# Patient Record
Sex: FEMALE | Race: WHITE | NOT HISPANIC OR LATINO | URBAN - METROPOLITAN AREA
[De-identification: names, ages, dates, MRNs, and addresses within clinical notes are randomized per-mention and may not be internally consistent; named-entity substitution may affect disease eponyms.]

---

## 2017-02-20 ENCOUNTER — OFFICE VISIT - HEALTHEAST (OUTPATIENT)
Dept: FAMILY MEDICINE | Facility: CLINIC | Age: 42
End: 2017-02-20

## 2017-02-20 DIAGNOSIS — N92.6 MISSED MENSES: ICD-10-CM

## 2017-02-20 DIAGNOSIS — E03.9 HYPOTHYROIDISM: ICD-10-CM

## 2017-02-20 DIAGNOSIS — Z34.91 PREGNANT AND NOT YET DELIVERED IN FIRST TRIMESTER: ICD-10-CM

## 2017-02-20 DIAGNOSIS — Z64.1 GRAND MULTIPARA: ICD-10-CM

## 2017-02-21 ENCOUNTER — AMBULATORY - HEALTHEAST (OUTPATIENT)
Dept: FAMILY MEDICINE | Facility: CLINIC | Age: 42
End: 2017-02-21

## 2017-03-08 ENCOUNTER — HOSPITAL ENCOUNTER (OUTPATIENT)
Dept: ULTRASOUND IMAGING | Facility: HOSPITAL | Age: 42
Discharge: HOME | End: 2017-03-08
Attending: ADVANCED PRACTICE MIDWIFE

## 2017-03-08 ENCOUNTER — AMBULATORY - HEALTHEAST (OUTPATIENT)
Dept: MIDWIFE SERVICES | Facility: CLINIC | Age: 42
End: 2017-03-08

## 2017-03-08 ENCOUNTER — PRENATAL OFFICE VISIT - HEALTHEAST (OUTPATIENT)
Dept: MIDWIFE SERVICES | Facility: CLINIC | Age: 42
End: 2017-03-08

## 2017-03-08 DIAGNOSIS — O09.529 SUPERVISION OF HIGH-RISK PREGNANCY OF ELDERLY MULTIGRAVIDA: ICD-10-CM

## 2017-03-08 LAB — HIV 1+2 AB+HIV1 P24 AG SERPL QL IA: NEGATIVE

## 2017-03-08 ASSESSMENT — MIFFLIN-ST. JEOR: SCORE: 1266.16

## 2017-03-09 LAB
HBV SURFACE AG SERPL QL IA: NEGATIVE
HCV AB SERPL QL IA: NEGATIVE
SYPHILIS RPR SCREEN - HISTORICAL: NORMAL

## 2017-03-15 ENCOUNTER — PRENATAL OFFICE VISIT - HEALTHEAST (OUTPATIENT)
Dept: MIDWIFE SERVICES | Facility: CLINIC | Age: 42
End: 2017-03-15

## 2017-03-15 DIAGNOSIS — E03.9 HYPOTHYROIDISM, UNSPECIFIED TYPE: ICD-10-CM

## 2017-03-15 DIAGNOSIS — I83.91 ASYMPTOMATIC VARICOSE VEINS, RIGHT: ICD-10-CM

## 2017-03-15 DIAGNOSIS — O09.521 SUPERVISION OF HIGH-RISK PREGNANCY OF ELDERLY MULTIGRAVIDA, FIRST TRIMESTER: ICD-10-CM

## 2017-03-15 ASSESSMENT — MIFFLIN-ST. JEOR: SCORE: 1279.77

## 2017-03-16 ENCOUNTER — COMMUNICATION - HEALTHEAST (OUTPATIENT)
Dept: FAMILY MEDICINE | Facility: CLINIC | Age: 42
End: 2017-03-16

## 2017-03-16 ENCOUNTER — AMBULATORY - HEALTHEAST (OUTPATIENT)
Dept: FAMILY MEDICINE | Facility: CLINIC | Age: 42
End: 2017-03-16

## 2017-03-17 ENCOUNTER — AMBULATORY - HEALTHEAST (OUTPATIENT)
Dept: MIDWIFE SERVICES | Facility: CLINIC | Age: 42
End: 2017-03-17

## 2017-03-17 DIAGNOSIS — O99.281 HYPOTHYROID IN PREGNANCY, ANTEPARTUM, FIRST TRIMESTER: ICD-10-CM

## 2017-03-17 DIAGNOSIS — E03.9 HYPOTHYROID IN PREGNANCY, ANTEPARTUM, FIRST TRIMESTER: ICD-10-CM

## 2017-03-17 DIAGNOSIS — O09.521 SUPERVISION OF HIGH-RISK PREGNANCY OF ELDERLY MULTIGRAVIDA, FIRST TRIMESTER: ICD-10-CM

## 2017-04-17 ENCOUNTER — PRENATAL OFFICE VISIT - HEALTHEAST (OUTPATIENT)
Dept: MIDWIFE SERVICES | Facility: CLINIC | Age: 42
End: 2017-04-17

## 2017-04-17 DIAGNOSIS — G47.00 INSOMNIA: ICD-10-CM

## 2017-04-17 DIAGNOSIS — O09.522 SUPERVISION OF HIGH-RISK PREGNANCY OF ELDERLY MULTIGRAVIDA, SECOND TRIMESTER: ICD-10-CM

## 2017-04-17 DIAGNOSIS — E03.9 HYPOTHYROID: ICD-10-CM

## 2017-04-17 ASSESSMENT — MIFFLIN-ST. JEOR: SCORE: 1295.08

## 2017-04-24 ENCOUNTER — COMMUNICATION - HEALTHEAST (OUTPATIENT)
Dept: ADMINISTRATIVE | Facility: CLINIC | Age: 42
End: 2017-04-24

## 2017-05-15 ENCOUNTER — AMBULATORY - HEALTHEAST (OUTPATIENT)
Dept: MIDWIFE SERVICES | Facility: CLINIC | Age: 42
End: 2017-05-15

## 2017-05-15 ENCOUNTER — HOSPITAL ENCOUNTER (OUTPATIENT)
Dept: ULTRASOUND IMAGING | Facility: CLINIC | Age: 42
Discharge: HOME | End: 2017-05-15
Attending: ADVANCED PRACTICE MIDWIFE

## 2017-05-15 DIAGNOSIS — O09.522 SUPERVISION OF HIGH-RISK PREGNANCY OF ELDERLY MULTIGRAVIDA, SECOND TRIMESTER: ICD-10-CM

## 2017-05-19 ENCOUNTER — PRENATAL OFFICE VISIT - HEALTHEAST (OUTPATIENT)
Dept: MIDWIFE SERVICES | Facility: CLINIC | Age: 42
End: 2017-05-19

## 2017-05-19 DIAGNOSIS — O99.019 ANEMIA AFFECTING PREGNANCY: ICD-10-CM

## 2017-05-19 DIAGNOSIS — O09.522 SUPERVISION OF HIGH-RISK PREGNANCY OF ELDERLY MULTIGRAVIDA, SECOND TRIMESTER: ICD-10-CM

## 2017-05-19 DIAGNOSIS — E03.9 HYPOTHYROIDISM: ICD-10-CM

## 2017-05-19 ASSESSMENT — MIFFLIN-ST. JEOR: SCORE: 1297.92

## 2017-05-20 ENCOUNTER — COMMUNICATION - HEALTHEAST (OUTPATIENT)
Dept: MIDWIFE SERVICES | Facility: CLINIC | Age: 42
End: 2017-05-20

## 2017-05-21 ENCOUNTER — COMMUNICATION - HEALTHEAST (OUTPATIENT)
Dept: FAMILY MEDICINE | Facility: CLINIC | Age: 42
End: 2017-05-21

## 2017-05-22 ENCOUNTER — AMBULATORY - HEALTHEAST (OUTPATIENT)
Dept: FAMILY MEDICINE | Facility: CLINIC | Age: 42
End: 2017-05-22

## 2017-05-28 ENCOUNTER — AMBULATORY - HEALTHEAST (OUTPATIENT)
Dept: OBGYN | Facility: CLINIC | Age: 42
End: 2017-05-28

## 2017-05-28 DIAGNOSIS — E03.9 HYPOTHYROID: ICD-10-CM

## 2017-07-05 ENCOUNTER — AMBULATORY - HEALTHEAST (OUTPATIENT)
Dept: OBGYN | Facility: CLINIC | Age: 42
End: 2017-07-05

## 2017-07-05 ENCOUNTER — PRENATAL OFFICE VISIT - HEALTHEAST (OUTPATIENT)
Dept: MIDWIFE SERVICES | Facility: CLINIC | Age: 42
End: 2017-07-05

## 2017-07-05 DIAGNOSIS — I83.91 ASYMPTOMATIC VARICOSE VEINS, RIGHT: ICD-10-CM

## 2017-07-05 DIAGNOSIS — O09.522 ELDERLY MULTIGRAVIDA IN SECOND TRIMESTER: ICD-10-CM

## 2017-07-05 DIAGNOSIS — E03.9 HYPOTHYROIDISM, UNSPECIFIED TYPE: ICD-10-CM

## 2017-07-05 DIAGNOSIS — O09.522 SUPERVISION OF HIGH-RISK PREGNANCY OF ELDERLY MULTIGRAVIDA, SECOND TRIMESTER: ICD-10-CM

## 2017-07-05 ASSESSMENT — MIFFLIN-ST. JEOR: SCORE: 1325.13

## 2017-07-06 ENCOUNTER — COMMUNICATION - HEALTHEAST (OUTPATIENT)
Dept: OBGYN | Facility: CLINIC | Age: 42
End: 2017-07-06

## 2017-08-09 ENCOUNTER — PRENATAL OFFICE VISIT - HEALTHEAST (OUTPATIENT)
Dept: MIDWIFE SERVICES | Facility: CLINIC | Age: 42
End: 2017-08-09

## 2017-08-09 DIAGNOSIS — O09.529 SUPERVISION OF HIGH-RISK PREGNANCY OF ELDERLY MULTIGRAVIDA: ICD-10-CM

## 2017-08-09 DIAGNOSIS — O09.529 AMA (ADVANCED MATERNAL AGE) MULTIGRAVIDA 35+: ICD-10-CM

## 2017-08-09 ASSESSMENT — MIFFLIN-ST. JEOR: SCORE: 1361.42

## 2017-08-23 ENCOUNTER — PRENATAL OFFICE VISIT - HEALTHEAST (OUTPATIENT)
Dept: MIDWIFE SERVICES | Facility: CLINIC | Age: 42
End: 2017-08-23

## 2017-08-23 ENCOUNTER — COMMUNICATION - HEALTHEAST (OUTPATIENT)
Dept: MIDWIFE SERVICES | Facility: CLINIC | Age: 42
End: 2017-08-23

## 2017-08-23 DIAGNOSIS — O09.529 SUPERVISION OF HIGH-RISK PREGNANCY OF ELDERLY MULTIGRAVIDA: ICD-10-CM

## 2017-08-23 ASSESSMENT — MIFFLIN-ST. JEOR: SCORE: 1363.23

## 2017-09-06 ENCOUNTER — PRENATAL OFFICE VISIT - HEALTHEAST (OUTPATIENT)
Dept: MIDWIFE SERVICES | Facility: CLINIC | Age: 42
End: 2017-09-06

## 2017-09-06 DIAGNOSIS — O09.523 SUPERVISION OF HIGH-RISK PREGNANCY OF ELDERLY MULTIGRAVIDA, THIRD TRIMESTER: ICD-10-CM

## 2017-09-06 DIAGNOSIS — O09.523 ELDERLY MULTIGRAVIDA IN THIRD TRIMESTER: ICD-10-CM

## 2017-09-06 DIAGNOSIS — E03.9 HYPOTHYROIDISM, UNSPECIFIED TYPE: ICD-10-CM

## 2017-09-06 ASSESSMENT — MIFFLIN-ST. JEOR: SCORE: 1371.85

## 2017-09-12 ENCOUNTER — HOSPITAL ENCOUNTER (OUTPATIENT)
Dept: OBGYN | Facility: HOSPITAL | Age: 42
Discharge: HOME | End: 2017-09-12
Attending: ADVANCED PRACTICE MIDWIFE | Admitting: ADVANCED PRACTICE MIDWIFE

## 2017-09-12 ENCOUNTER — COMMUNICATION - HEALTHEAST (OUTPATIENT)
Dept: OBGYN | Facility: CLINIC | Age: 42
End: 2017-09-12

## 2017-09-12 ASSESSMENT — MIFFLIN-ST. JEOR: SCORE: 1375.03

## 2017-09-18 ENCOUNTER — HOSPITAL ENCOUNTER (OUTPATIENT)
Dept: OBGYN | Facility: HOSPITAL | Age: 42
Discharge: HOME | End: 2017-09-18
Attending: ADVANCED PRACTICE MIDWIFE | Admitting: ADVANCED PRACTICE MIDWIFE

## 2017-09-18 ENCOUNTER — PRENATAL OFFICE VISIT - HEALTHEAST (OUTPATIENT)
Dept: MIDWIFE SERVICES | Facility: CLINIC | Age: 42
End: 2017-09-18

## 2017-09-18 DIAGNOSIS — O09.529 SUPERVISION OF HIGH-RISK PREGNANCY OF ELDERLY MULTIGRAVIDA: ICD-10-CM

## 2017-09-18 ASSESSMENT — MIFFLIN-ST. JEOR
SCORE: 1358.03
SCORE: 1358.03

## 2017-09-23 ENCOUNTER — COMMUNICATION - HEALTHEAST (OUTPATIENT)
Dept: OBGYN | Facility: CLINIC | Age: 42
End: 2017-09-23

## 2017-09-24 ENCOUNTER — ANESTHESIA - HEALTHEAST (OUTPATIENT)
Dept: OBGYN | Facility: HOSPITAL | Age: 42
End: 2017-09-24

## 2017-10-05 ENCOUNTER — COMMUNICATION - HEALTHEAST (OUTPATIENT)
Dept: FAMILY MEDICINE | Facility: CLINIC | Age: 42
End: 2017-10-05

## 2017-10-24 ENCOUNTER — COMMUNICATION - HEALTHEAST (OUTPATIENT)
Dept: MIDWIFE SERVICES | Facility: CLINIC | Age: 42
End: 2017-10-24

## 2017-11-08 ENCOUNTER — OFFICE VISIT - HEALTHEAST (OUTPATIENT)
Dept: MIDWIFE SERVICES | Facility: CLINIC | Age: 42
End: 2017-11-08

## 2017-11-08 DIAGNOSIS — Z00.00 HEALTH CARE MAINTENANCE: ICD-10-CM

## 2017-11-08 DIAGNOSIS — N81.6 RECTOCELE: ICD-10-CM

## 2017-11-08 ASSESSMENT — MIFFLIN-ST. JEOR: SCORE: 1258.23

## 2017-11-09 ENCOUNTER — COMMUNICATION - HEALTHEAST (OUTPATIENT)
Dept: MIDWIFE SERVICES | Facility: CLINIC | Age: 42
End: 2017-11-09

## 2017-11-12 ENCOUNTER — COMMUNICATION - HEALTHEAST (OUTPATIENT)
Dept: OBGYN | Facility: CLINIC | Age: 42
End: 2017-11-12

## 2017-11-12 DIAGNOSIS — E05.90 SUBCLINICAL HYPERTHYROIDISM: ICD-10-CM

## 2017-11-13 ENCOUNTER — COMMUNICATION - HEALTHEAST (OUTPATIENT)
Dept: OBGYN | Facility: CLINIC | Age: 42
End: 2017-11-13

## 2017-11-13 DIAGNOSIS — E03.9 HYPOTHYROIDISM: ICD-10-CM

## 2017-12-13 ENCOUNTER — AMBULATORY - HEALTHEAST (OUTPATIENT)
Dept: MIDWIFE SERVICES | Facility: CLINIC | Age: 42
End: 2017-12-13

## 2017-12-13 ENCOUNTER — COMMUNICATION - HEALTHEAST (OUTPATIENT)
Dept: ADMINISTRATIVE | Facility: CLINIC | Age: 42
End: 2017-12-13

## 2017-12-13 ENCOUNTER — RECORDS - HEALTHEAST (OUTPATIENT)
Dept: ADMINISTRATIVE | Facility: OTHER | Age: 42
End: 2017-12-13

## 2017-12-13 DIAGNOSIS — E03.9 HYPOTHYROIDISM: ICD-10-CM

## 2018-01-11 ENCOUNTER — AMBULATORY - HEALTHEAST (OUTPATIENT)
Dept: LAB | Facility: CLINIC | Age: 43
End: 2018-01-11

## 2018-01-11 DIAGNOSIS — E03.9 HYPOTHYROIDISM: ICD-10-CM

## 2018-01-11 LAB
T4 FREE SERPL-MCNC: 1.2 NG/DL (ref 0.7–1.8)
TSH SERPL DL<=0.005 MIU/L-ACNC: 0.11 UIU/ML (ref 0.3–5)

## 2018-01-12 ENCOUNTER — COMMUNICATION - HEALTHEAST (OUTPATIENT)
Dept: FAMILY MEDICINE | Facility: CLINIC | Age: 43
End: 2018-01-12

## 2018-01-12 ENCOUNTER — COMMUNICATION - HEALTHEAST (OUTPATIENT)
Dept: MIDWIFE SERVICES | Facility: CLINIC | Age: 43
End: 2018-01-12

## 2018-01-12 DIAGNOSIS — E03.9 HYPOTHYROID: ICD-10-CM

## 2018-01-16 ENCOUNTER — AMBULATORY - HEALTHEAST (OUTPATIENT)
Dept: OBGYN | Facility: CLINIC | Age: 43
End: 2018-01-16

## 2018-01-16 DIAGNOSIS — E03.9 HYPOTHYROIDISM: ICD-10-CM

## 2018-05-10 ENCOUNTER — COMMUNICATION - HEALTHEAST (OUTPATIENT)
Dept: FAMILY MEDICINE | Facility: CLINIC | Age: 43
End: 2018-05-10

## 2018-05-10 ENCOUNTER — COMMUNICATION - HEALTHEAST (OUTPATIENT)
Dept: MIDWIFE SERVICES | Facility: CLINIC | Age: 43
End: 2018-05-10

## 2018-05-10 ENCOUNTER — AMBULATORY - HEALTHEAST (OUTPATIENT)
Dept: FAMILY MEDICINE | Facility: CLINIC | Age: 43
End: 2018-05-10

## 2018-05-10 DIAGNOSIS — E03.9 HYPOTHYROIDISM, UNSPECIFIED TYPE: ICD-10-CM

## 2018-05-10 DIAGNOSIS — E03.9 HYPOTHYROID: ICD-10-CM

## 2018-06-18 ENCOUNTER — RECORDS - HEALTHEAST (OUTPATIENT)
Dept: ADMINISTRATIVE | Facility: OTHER | Age: 43
End: 2018-06-18

## 2018-07-10 ENCOUNTER — OFFICE VISIT - HEALTHEAST (OUTPATIENT)
Dept: FAMILY MEDICINE | Facility: CLINIC | Age: 43
End: 2018-07-10

## 2018-07-10 DIAGNOSIS — M65.4 DE QUERVAIN'S DISEASE (TENOSYNOVITIS): ICD-10-CM

## 2018-07-10 DIAGNOSIS — E03.9 HYPOTHYROID: ICD-10-CM

## 2018-07-10 LAB
T4 FREE SERPL-MCNC: 1.2 NG/DL (ref 0.7–1.8)
TSH SERPL DL<=0.005 MIU/L-ACNC: 0.17 UIU/ML (ref 0.3–5)

## 2018-09-26 ENCOUNTER — OFFICE VISIT - HEALTHEAST (OUTPATIENT)
Dept: FAMILY MEDICINE | Facility: CLINIC | Age: 43
End: 2018-09-26

## 2018-09-26 DIAGNOSIS — E03.9 HYPOTHYROIDISM IN ADULT: ICD-10-CM

## 2018-09-26 DIAGNOSIS — M54.50 LOWER BACK PAIN: ICD-10-CM

## 2018-09-26 LAB
BASOPHILS # BLD AUTO: 0 THOU/UL (ref 0–0.2)
BASOPHILS NFR BLD AUTO: 1 % (ref 0–2)
EOSINOPHIL # BLD AUTO: 0.2 THOU/UL (ref 0–0.4)
EOSINOPHIL NFR BLD AUTO: 3 % (ref 0–6)
ERYTHROCYTE [DISTWIDTH] IN BLOOD BY AUTOMATED COUNT: 12.2 % (ref 11–14.5)
HBA1C MFR BLD: 5.5 % (ref 3.5–6)
HCT VFR BLD AUTO: 39.6 % (ref 35–47)
HGB BLD-MCNC: 13.4 G/DL (ref 12–16)
LYMPHOCYTES # BLD AUTO: 1.9 THOU/UL (ref 0.8–4.4)
LYMPHOCYTES NFR BLD AUTO: 36 % (ref 20–40)
MCH RBC QN AUTO: 31 PG (ref 27–34)
MCHC RBC AUTO-ENTMCNC: 33.8 G/DL (ref 32–36)
MCV RBC AUTO: 92 FL (ref 80–100)
MONOCYTES # BLD AUTO: 0.4 THOU/UL (ref 0–0.9)
MONOCYTES NFR BLD AUTO: 7 % (ref 2–10)
NEUTROPHILS # BLD AUTO: 2.8 THOU/UL (ref 2–7.7)
NEUTROPHILS NFR BLD AUTO: 53 % (ref 50–70)
PLATELET # BLD AUTO: 268 THOU/UL (ref 140–440)
PMV BLD AUTO: 7.4 FL (ref 7–10)
RBC # BLD AUTO: 4.31 MILL/UL (ref 3.8–5.4)
TSH SERPL DL<=0.005 MIU/L-ACNC: 2.13 UIU/ML (ref 0.3–5)
WBC: 5.2 THOU/UL (ref 4–11)

## 2018-09-27 ENCOUNTER — AMBULATORY - HEALTHEAST (OUTPATIENT)
Dept: FAMILY MEDICINE | Facility: CLINIC | Age: 43
End: 2018-09-27

## 2018-09-27 DIAGNOSIS — E03.9 HYPOTHYROIDISM: ICD-10-CM

## 2018-10-11 ENCOUNTER — HOSPITAL ENCOUNTER (OUTPATIENT)
Dept: PHYSICAL MEDICINE AND REHAB | Facility: CLINIC | Age: 43
Discharge: HOME | End: 2018-10-11
Attending: PHYSICAL MEDICINE & REHABILITATION

## 2018-10-11 DIAGNOSIS — M54.50 CHRONIC LOW BACK PAIN WITHOUT SCIATICA: ICD-10-CM

## 2018-10-11 DIAGNOSIS — K62.5 RECTAL BLEEDING: ICD-10-CM

## 2018-10-11 DIAGNOSIS — G89.29 CHRONIC LOW BACK PAIN WITHOUT SCIATICA: ICD-10-CM

## 2018-10-11 DIAGNOSIS — G47.9 SLEEP DISTURBANCE: ICD-10-CM

## 2018-10-11 DIAGNOSIS — M51.360 DISCOGENIC LOW BACK PAIN: ICD-10-CM

## 2018-10-11 DIAGNOSIS — K59.00 CONSTIPATION: ICD-10-CM

## 2018-10-11 DIAGNOSIS — R63.4 WEIGHT LOSS: ICD-10-CM

## 2018-10-11 RX ORDER — ZOLPIDEM TARTRATE 5 MG/1
TABLET ORAL
Qty: 5 TABLET | Refills: 0 | Status: SHIPPED | OUTPATIENT
Start: 2018-10-11

## 2018-10-11 RX ORDER — NABUMETONE 500 MG/1
TABLET, FILM COATED ORAL
Qty: 90 TABLET | Refills: 1 | Status: SHIPPED | OUTPATIENT
Start: 2018-10-11

## 2018-10-11 ASSESSMENT — MIFFLIN-ST. JEOR: SCORE: 1221.95

## 2018-10-17 ENCOUNTER — OFFICE VISIT - HEALTHEAST (OUTPATIENT)
Dept: PHYSICAL THERAPY | Facility: REHABILITATION | Age: 43
End: 2018-10-17

## 2018-10-17 DIAGNOSIS — M54.50 CHRONIC BILATERAL LOW BACK PAIN WITHOUT SCIATICA: ICD-10-CM

## 2018-10-17 DIAGNOSIS — G89.29 CHRONIC BILATERAL LOW BACK PAIN WITHOUT SCIATICA: ICD-10-CM

## 2018-10-26 ENCOUNTER — COMMUNICATION - HEALTHEAST (OUTPATIENT)
Dept: PHYSICAL MEDICINE AND REHAB | Facility: CLINIC | Age: 43
End: 2018-10-26

## 2018-10-30 ENCOUNTER — OFFICE VISIT - HEALTHEAST (OUTPATIENT)
Dept: PHYSICAL THERAPY | Facility: REHABILITATION | Age: 43
End: 2018-10-30

## 2018-10-30 DIAGNOSIS — G89.29 CHRONIC BILATERAL LOW BACK PAIN WITHOUT SCIATICA: ICD-10-CM

## 2018-10-30 DIAGNOSIS — M54.50 CHRONIC BILATERAL LOW BACK PAIN WITHOUT SCIATICA: ICD-10-CM

## 2018-12-03 ENCOUNTER — OFFICE VISIT - HEALTHEAST (OUTPATIENT)
Dept: PHYSICAL THERAPY | Facility: REHABILITATION | Age: 43
End: 2018-12-03

## 2018-12-03 DIAGNOSIS — G89.29 CHRONIC BILATERAL LOW BACK PAIN WITHOUT SCIATICA: ICD-10-CM

## 2018-12-03 DIAGNOSIS — M54.50 CHRONIC BILATERAL LOW BACK PAIN WITHOUT SCIATICA: ICD-10-CM

## 2018-12-10 ENCOUNTER — OFFICE VISIT - HEALTHEAST (OUTPATIENT)
Dept: PHYSICAL THERAPY | Facility: REHABILITATION | Age: 43
End: 2018-12-10

## 2018-12-10 DIAGNOSIS — M54.50 CHRONIC BILATERAL LOW BACK PAIN WITHOUT SCIATICA: ICD-10-CM

## 2018-12-10 DIAGNOSIS — G89.29 CHRONIC BILATERAL LOW BACK PAIN WITHOUT SCIATICA: ICD-10-CM

## 2018-12-17 ENCOUNTER — COMMUNICATION - HEALTHEAST (OUTPATIENT)
Dept: PHYSICAL THERAPY | Facility: CLINIC | Age: 43
End: 2018-12-17

## 2018-12-19 ENCOUNTER — COMMUNICATION - HEALTHEAST (OUTPATIENT)
Dept: FAMILY MEDICINE | Facility: CLINIC | Age: 43
End: 2018-12-19

## 2019-01-04 ENCOUNTER — OFFICE VISIT - HEALTHEAST (OUTPATIENT)
Dept: PHYSICAL THERAPY | Facility: REHABILITATION | Age: 44
End: 2019-01-04

## 2019-01-04 DIAGNOSIS — M54.50 CHRONIC BILATERAL LOW BACK PAIN WITHOUT SCIATICA: ICD-10-CM

## 2019-01-04 DIAGNOSIS — G89.29 CHRONIC BILATERAL LOW BACK PAIN WITHOUT SCIATICA: ICD-10-CM

## 2019-01-28 ENCOUNTER — OFFICE VISIT - HEALTHEAST (OUTPATIENT)
Dept: PHYSICAL THERAPY | Facility: REHABILITATION | Age: 44
End: 2019-01-28

## 2019-01-28 DIAGNOSIS — G89.29 CHRONIC BILATERAL LOW BACK PAIN WITHOUT SCIATICA: ICD-10-CM

## 2019-01-28 DIAGNOSIS — M54.50 CHRONIC BILATERAL LOW BACK PAIN WITHOUT SCIATICA: ICD-10-CM

## 2019-02-04 ENCOUNTER — OFFICE VISIT - HEALTHEAST (OUTPATIENT)
Dept: PHYSICAL THERAPY | Facility: REHABILITATION | Age: 44
End: 2019-02-04

## 2019-02-04 DIAGNOSIS — G89.29 CHRONIC BILATERAL LOW BACK PAIN WITHOUT SCIATICA: ICD-10-CM

## 2019-02-04 DIAGNOSIS — M54.50 CHRONIC BILATERAL LOW BACK PAIN WITHOUT SCIATICA: ICD-10-CM

## 2019-02-11 ENCOUNTER — OFFICE VISIT - HEALTHEAST (OUTPATIENT)
Dept: PHYSICAL THERAPY | Facility: REHABILITATION | Age: 44
End: 2019-02-11

## 2019-02-11 DIAGNOSIS — M54.50 CHRONIC BILATERAL LOW BACK PAIN WITHOUT SCIATICA: ICD-10-CM

## 2019-02-11 DIAGNOSIS — G89.29 CHRONIC BILATERAL LOW BACK PAIN WITHOUT SCIATICA: ICD-10-CM

## 2019-02-12 ENCOUNTER — COMMUNICATION - HEALTHEAST (OUTPATIENT)
Dept: FAMILY MEDICINE | Facility: CLINIC | Age: 44
End: 2019-02-12

## 2019-02-12 DIAGNOSIS — E03.9 HYPOTHYROIDISM: ICD-10-CM

## 2019-02-12 RX ORDER — LEVOTHYROXINE SODIUM 88 UG/1
88 TABLET ORAL DAILY
Qty: 90 TABLET | Refills: 2 | Status: SHIPPED | OUTPATIENT
Start: 2019-02-12

## 2019-02-18 ENCOUNTER — OFFICE VISIT - HEALTHEAST (OUTPATIENT)
Dept: PHYSICAL THERAPY | Facility: REHABILITATION | Age: 44
End: 2019-02-18

## 2019-02-18 DIAGNOSIS — G89.29 CHRONIC BILATERAL LOW BACK PAIN WITHOUT SCIATICA: ICD-10-CM

## 2019-02-18 DIAGNOSIS — M54.50 CHRONIC BILATERAL LOW BACK PAIN WITHOUT SCIATICA: ICD-10-CM

## 2019-03-18 ENCOUNTER — OFFICE VISIT - HEALTHEAST (OUTPATIENT)
Dept: PHYSICAL THERAPY | Facility: REHABILITATION | Age: 44
End: 2019-03-18

## 2019-03-18 DIAGNOSIS — G89.29 CHRONIC BILATERAL LOW BACK PAIN WITHOUT SCIATICA: ICD-10-CM

## 2019-03-18 DIAGNOSIS — M54.50 CHRONIC BILATERAL LOW BACK PAIN WITHOUT SCIATICA: ICD-10-CM

## 2019-04-16 ENCOUNTER — OFFICE VISIT - HEALTHEAST (OUTPATIENT)
Dept: PHYSICAL THERAPY | Facility: REHABILITATION | Age: 44
End: 2019-04-16

## 2019-04-16 DIAGNOSIS — M54.50 CHRONIC BILATERAL LOW BACK PAIN WITHOUT SCIATICA: ICD-10-CM

## 2019-04-16 DIAGNOSIS — G89.29 CHRONIC BILATERAL LOW BACK PAIN WITHOUT SCIATICA: ICD-10-CM

## 2019-06-11 ENCOUNTER — OFFICE VISIT - HEALTHEAST (OUTPATIENT)
Dept: PHYSICAL THERAPY | Facility: REHABILITATION | Age: 44
End: 2019-06-11

## 2019-06-11 DIAGNOSIS — G89.29 CHRONIC BILATERAL LOW BACK PAIN WITHOUT SCIATICA: ICD-10-CM

## 2019-06-11 DIAGNOSIS — M54.50 CHRONIC BILATERAL LOW BACK PAIN WITHOUT SCIATICA: ICD-10-CM

## 2019-07-08 ENCOUNTER — COMMUNICATION - HEALTHEAST (OUTPATIENT)
Dept: SCHEDULING | Facility: CLINIC | Age: 44
End: 2019-07-08

## 2021-05-27 ENCOUNTER — RECORDS - HEALTHEAST (OUTPATIENT)
Dept: ADMINISTRATIVE | Facility: CLINIC | Age: 46
End: 2021-05-27

## 2021-05-27 NOTE — PROGRESS NOTES
[facility] Daily Progress /Monthly Summary    Patient Name: Kenzie Peralta  Date: 2019  Visit #:   Referral Diagnosis: Chronic LBP without sciatica, Discogenic LBP  Referring provider: Sarika Yoon MD  Visit Diagnosis:     ICD-10-CM    1. Chronic bilateral low back pain without sciatica M54.5     G89.29          Assessment:     Patient feels she is still very limited in amount of time she can stand, but symptoms in LBP are relieved immediately by sitting. Patient has definitely increased her lumbar extension from her initial visit - She had trouble even laying prone for 1 minute.  Patient has been advised to recheck with Dr. Granados regarding her symptoms and little change in LBP with standing, as I am concerned with severe difficulties when MRI is very limited in findings.    Goal Status:  Pt. will be independent with home exercise program in : 4 weeks;12 weeks  Patient will stand : 10 minutes;with less pain;in 12 weeks;Comment  Comment:: Current status is that patient stands 3 minutes to cook, and back pain is 2-3/10. If longer than this and at 5 minutes or so she must walk or sit to relieve pain. Patient states pain got really back while standing at [employer] and sitting in car relieved in 5 minutes.    Pt will: be able to have bowel movment on a daily basis with 0-2/10 and in less than 5 minutes - complete evacuation in 12 weeks.: current status is she is much better with bowels, and currently using Magnesium.. She is able to have a bowel almost daily, no straining and no feeling of constipation, and pain in LB is 0-2/10.      Plan / Patient Education:     Continue with initial plan of care.  Recheck Rizwan's with sEMG.  Will see patient one more visit.    Subjective:     Pain Ratin LBP and no radiation when   Patient still has difficulty standing in one position for more than 1 minute, which makes meal prep, cleaning such as dishes, etc.      Patient has pain in right  suprapatellar tendon area pain.    Objective:     Cranial scan + in multiple systems including MS. Patient reports she is able to be active and play pickleball, but still having a hard time standing up straight.  Patient is standing up straighter.   Patient has muscle spasm throughout right quadriceps and was shown self mobilization to anterior thigh, and prone quad stretch.  Modified Oswestry Low Back Pain Disablity Questionnaire  in %: 26 (was 26% 2 months ago)     Trunk flexion is 6 inches, trunk extension is 50% of normal - 3-4/10 LBP    Treatment Today     TREATMENT MINUTES COMMENTS   Evaluation     Self-care/ Home management     Manual therapy     Neuromuscular Re-education     Therapeutic Activity     Therapeutic Exercises 45 Reviewed all exercises and reinforced continuing all exercises. Added quadriceps stretching  Exercises:  Exercise #1: Supine with back pillow to arch before getting up  Comment #1: daily 1 minute  Exercise #2: Kegel's long contractions  Comment #2: 10 seconds, 10 reps, 2x/day and increase to 10 reps- 2x/day- patient is only doing 5-10 reps of the long contractions- 1x/day, 10 seconds  Exercise #3: Kegel's short contractions  Comment #3: 1 second, 12 or more /day- patient thinks she is not regular with her exercises- 5 reps/day  Exercise #4: Standing trunk extension  Comment #4: 3x/hr, emphasized thoracic extension  Exercise #5: Prone on elbows  Comment #5: 1-5 minutes, 2x/day , may bend knees and use pillows  Exercise #6: Press ups  Comment #6: use 2 pillows under hips if needed, 1 minute - 2x/day  Exercise #7: Quadriceps stretch- prone or standing  Comment #7: 30 seconds daily       Gait training     Modality__________________                Total 45    Blank areas are intentional and mean the treatment did not include these items.       Chacha Renee  4/16/2019

## 2021-05-29 NOTE — PROGRESS NOTES
[facility] Daily Progress /Monthly Summary    Patient Name: Kenzie Peralta  Date: 2019  Visit #:   Referral Diagnosis: Chronic LBP without sciatica, Discogenic LBP  Referring provider: Sarika Yoon MD  Visit Diagnosis:     ICD-10-CM    1. Chronic bilateral low back pain without sciatica M54.5     G89.29          Assessment:     Patient last seen over 7 weeks ago in PT (19).  Patient feels she is still  limited in amount of time she can stand, but she can stand a solid 10 minutes with less back pain overall than 7 weeks ago. Patient has been doing her exercises and can now lay prone for 2 minutes with much less pain. She also notes she was able to carry her baby on her back and didn't increase her LBP, which 1 year ago this would have done.    Patient overall feels more fit and participating in activity, and has found eliminating gluten helps with sensitivities and constipation.  Pelvic floor strength also has improved.      Goal Status:  Pt. will be independent with home exercise program in : 4 weeks;12 weeks  Patient will stand : 10 minutes;with less pain;in 12 weeks;Comment  Comment:: Current status is that patient stands 5-10 minutes to cook, and back pain is 5/10. If longer than this and at 5 minutes or so she must walk or sit to relieve pain.     Pt will: be able to have bowel movment on a daily basis with 0-2/10 and in less than 5 minutes - complete evacuation in 12 weeks.: current status is she is much better with bowels, and currently using Magnesium.. She is able to have a bowel almost daily, no straining and no feeling of constipation,. Pt is also doing Kelp      Plan / Patient Education:     Patient has attended 12 visits of PT from 10/17/18 to 19 with 2 missed appointment. Patient is ready to discharge to a home exercise program and should continue to improve slowly with her pelvic floor strength and low back pain    Subjective:     Pain Ratin LBP   Patient  still has difficulty standing in one position for more than 1 minute, which makes meal prep, cleaning such as dishes, etc.      Patient has pain in right suprapatellar tendon area pain.    Objective:     Cranial scan + in multiple systems including MS. Patient reports she is able to be active and play pickleball, but still having a hard time standing up straight. Patient does find she has less volume of urine loss when playing pickelball  Now, then she did 6 months ago  Patient is standing up straighter.   Patient has muscle spasm throughout right quadriceps and was shown self mobilization to anterior thigh, and prone quad stretch.  Modified Oswestry Low Back Pain Disablity Questionnaire  in %: 26 (was 26% 2 months ago)     Trunk flexion is 4 3/4 inches fingertips to the floor (was 6 inches), trunk extension is 50% of normal (prone on elbows) - 2/10 LBP    sEMG applied to per rectal area with patient consent and patient able to contract from 19 to 48 mA for 10 seconds, and maintained 20-38 mA after 10 reps of 10 second holds (more endurance). Patient also did bounce in place 10 seconds to simulate running and pickle ball, and she was able to maintain 60-95 mA pelvic floor contraction.    Treatment Today     TREATMENT MINUTES COMMENTS   Evaluation     Self-care/ Home management     Manual therapy     Neuromuscular Re-education     Therapeutic Activity     Therapeutic Exercises 55 Reviewed all exercises and reinforced continued HEP. I encouraged her to periodically add side to side hop and hop in place to increase resistance to pelvic floor.    Exercises:  Exercise #1: Supine with back pillow to arch before getting up  Comment #1: daily 1 minute  Exercise #2: Kegel's long contractions  Comment #2: 10 seconds, 10 reps, 2x/day - 2x/day- patient is only doing 5-10 reps of the long contractions- 1x/day, 10 seconds  Exercise #3: Kegel's short contractions  Comment #3: 1 second, 12 or more /day- patient thinks she is not  regular with her exercises- 5 -10 reps/day  Exercise #4: Standing trunk extension  Comment #4: 3x/hr, emphasized thoracic extension  Exercise #5: Prone on elbows  Comment #5: 1-5 minutes, 2x/day , may bend knees and use pillows  Exercise #6: Press ups  Comment #6: use 2 pillows under hips if needed, 1 minute - 2x/day  Exercise #7: Quadriceps stretch- prone or standing  Comment #7: 30 seconds daily      Gait training     Modality__________________                Total 55    Blank areas are intentional and mean the treatment did not include these items.       Chacha Renee  6/11/2019

## 2021-05-30 VITALS — BODY MASS INDEX: 20.33 KG/M2 | WEIGHT: 126.9 LBS

## 2021-05-30 VITALS — WEIGHT: 131 LBS | HEIGHT: 68 IN | BODY MASS INDEX: 19.85 KG/M2

## 2021-05-30 VITALS — HEIGHT: 68 IN | WEIGHT: 128 LBS | BODY MASS INDEX: 19.4 KG/M2

## 2021-05-30 VITALS — BODY MASS INDEX: 20.36 KG/M2 | WEIGHT: 134.38 LBS | HEIGHT: 68 IN

## 2021-05-30 NOTE — TELEPHONE ENCOUNTER
Pt has 6 kids, and is breastfeeding, and is calling in about right breast pain she developed several days ago. Pt has been using hot compresses, and has been able to take care of this in the past when she has had Mastitis by using hot compresses. Pt reports she has a fever, but does not know what it is. Breast is painful to touch, and has slight redness.   Home care measures discussed, and per protocol pt should be seen in the clinic today. No appointments are available so she will go to the [facility]. Pt agrees with plan and will go to the [facility] in [place]. Advised pt to call back if she has further questions or concerns.     Leonard Sin, RN [facility] Triage/Medication Refill     Reason for Disposition    Breast is painful to touch and fever    Protocols used: BREAST SYMPTOMS-A-[place]

## 2021-05-31 ENCOUNTER — RECORDS - HEALTHEAST (OUTPATIENT)
Dept: ADMINISTRATIVE | Facility: CLINIC | Age: 46
End: 2021-05-31

## 2021-05-31 VITALS — BODY MASS INDEX: 22.64 KG/M2 | WEIGHT: 149.4 LBS | HEIGHT: 68 IN

## 2021-05-31 VITALS — HEIGHT: 68 IN | WEIGHT: 149 LBS | BODY MASS INDEX: 22.58 KG/M2

## 2021-05-31 VITALS — WEIGHT: 151.3 LBS | BODY MASS INDEX: 22.93 KG/M2 | HEIGHT: 68 IN

## 2021-05-31 VITALS — WEIGHT: 153 LBS | BODY MASS INDEX: 23.61 KG/M2

## 2021-05-31 VITALS — HEIGHT: 68 IN | BODY MASS INDEX: 20.46 KG/M2 | WEIGHT: 135 LBS

## 2021-05-31 VITALS — WEIGHT: 141 LBS | BODY MASS INDEX: 21.37 KG/M2 | HEIGHT: 68 IN

## 2021-05-31 VITALS — HEIGHT: 67 IN | BODY MASS INDEX: 23.54 KG/M2 | WEIGHT: 150 LBS

## 2021-05-31 VITALS — WEIGHT: 152 LBS | BODY MASS INDEX: 23.04 KG/M2 | HEIGHT: 68 IN

## 2021-05-31 VITALS — HEIGHT: 67 IN | WEIGHT: 128 LBS | BODY MASS INDEX: 20.09 KG/M2

## 2021-06-01 VITALS — WEIGHT: 123.3 LBS | BODY MASS INDEX: 19.31 KG/M2

## 2021-06-02 VITALS — HEIGHT: 67 IN | WEIGHT: 120 LBS | BODY MASS INDEX: 18.83 KG/M2

## 2021-06-02 VITALS — BODY MASS INDEX: 19.17 KG/M2 | WEIGHT: 122.4 LBS

## 2021-06-09 NOTE — PROGRESS NOTES
Kenzie is here for YEN visit and completion of PE. States she continues to be very fatigued and wonders if thyroid medication (increased synthroid to 112 mcg after last visit) needs adjusting. It was 3+ weeks since IOB labs (2/20) and she opted to have it checked again today with results to Dr. Sarika Yoon, PCP. Denies nausea. RX for prenatal vitamins sent. Family vacation/skiing last week of March. Caution urged for Kenzie if downhill skiing. RX Jobst given for support stockings for R leg varicose veins extending just above the knee. Discussed rectocele is somewhat problematic but unsure if she would schedule surgery after this baby which she expects to be her last. Bimanual exam done and uterus is slightly RV - FHTs not heard with DT. Reassured with recent US. Encouraged to RTC 2-3 weeks to listen for FHTs. Denies bleeding. Reviewed IOB labs. Declines GC/chlamydia. Pap/HPV negative/negative 10/2015.

## 2021-06-09 NOTE — PROGRESS NOTES
PHONE CALL: This writer messaged Dr. ANA Yoon, PCP regarding recent thyroid labs. She recommended increasing synthroid from 112 to 125 mcg qd. Kenzie is very fatigued and requests increasing dose which I will order. Dr. Yoon recommended rechecking labs in 4 weeks (TSH and free T4).  HECTOR Titus,CNM

## 2021-06-09 NOTE — PROGRESS NOTES
PRENATAL VISIT   FIRST OBSTETRICAL EXAM - OB    Assessment / Impression     First prenatal visit at 10w0d    Multiparity  AMA  Hypothyroid    Plan:   -Dating & viability ultrasound ordered as no FHTs heard today  -IOB labs drawn.  -Pt is  interested in drawing lead level.  -Patient is  interested in waterbirth.  HIV and Hep C  drawn today.  -Reviewed prenatal care schedule  -Optimal nutrition and weight gain discussed.  Recommended wt gain is: 25-35#  -Anticipatory guidance for common pregnancy questions and concerns reviewed.   -Danger s/sx for this trimester reviewed with patient.  -Patient declines reviewing genetic screening options, patient does not elect for first trimester screening.  -IOB packet given and reviewed with patient.  -Reviewed safe food handling and consumption  -CNM services and hospital options reviewed; emergency and scheduling phone numbers given to patient.  Hypothyroid management by PCP  -Return to clinic 1 week for physical exam    Total time spent with patient: 40 minutes, >50% time spent counseling and coordinating care.  Patient's medica/surgical/social/genetic/family history reviewed and updated in the chart    Subjective:    Kenzie Peralta is a 41 y.o.  here today for her First Obstetrical Exam.  This is an unplanned, but welcomed pregnancy.  No VB.  She arrived nearly 10 minutes late for her visit and was advised to return in one week for the physical exam as there was not enough time to complete today.    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        OB History    Para Term  AB SAB TAB Ectopic Multiple Living   7 5 5  1 1    9      # Outcome Date GA Lbr Jamin/2nd Weight Sex Delivery Anes PTL Lv   7 Current            6 Term    8 lb (3.629 kg) M Vag-Spont EPI N Y   5 Term  38w5d  9 lb 15 oz (4.508 kg) M Vag-Spont None     4 Term  38w6d  7 lb 13 oz (3.544 kg) F Vag-Spont None N Y   3 Term  39w5d  8 lb 15  "oz (4.054 kg) M Vag-Spont None N Y   2 Term 1999 40w2d  8 lb 12 oz (3.969 kg) F Vag-Spont  N Y      Birth Comments: 3rd degree laceration   1 SAB                   Expected Date of Delivery: 10/4/2017, by Last Menstrual Period    Past Medical History:   Diagnosis Date     Acne     Created by Conversion      Bright Red Blood Per Rectum     Created by Conversion      Skin Neoplasm Of Uncertain Behavior     Created by Conversion      History reviewed. No pertinent surgical history.  Social History   Substance Use Topics     Smoking status: Never Smoker     Smokeless tobacco: None     Alcohol use No     Current Outpatient Prescriptions   Medication Sig Dispense Refill     CHOLECALCIFEROL, VITAMIN D3, (VITAMIN D3 ORAL) Take by mouth.       KELP ORAL Take by mouth.       levothyroxine (SYNTHROID) 112 MCG tablet Take 1 tablet (112 mcg total) by mouth daily. 30 tablet 3     PNV NO.122/IRON/FOLIC ACID (PRENATAL MULTI ORAL) Take by mouth.       No current facility-administered medications for this visit.      No Known Allergies          Pregnancy Risk Factors: Age is <18 or >35    Review of Systems  General:  Denies problem  Eyes: Denies problem  Ears/Nose/Throat: Denies problem  Cardiovascular: Denies problem  Respiratory:  Denies problem  Gastrointestinal:  Denies problem  Genitourinary: Denies problem  Musculoskeletal:  Denies problem  Skin: Denies problem  Neurologic: Denies problem  Psychiatric: Denies problem  Endocrine: Denies problem  Heme/Lymphatic: Denies problem   Allergic/Immunologic: Denies problem       Objective:   Objective    Vitals:    03/08/17 1339   BP: 106/64   Pulse: 88   Resp: 16   Weight: 128 lb (58.1 kg)   Height: 5' 7.5\" (1.715 m)     Physical Exam:  General Appearance: Alert, cooperative, no distress, appears stated age  FHT: not heard       "

## 2021-06-09 NOTE — PROGRESS NOTES
Assessment/Plan:        Diagnoses and all orders for this visit:    Missed menses  -     Pregnancy, Urine    Hypothyroidism  -     Thyroid Cascade   Will adjust medications as needed based on TSH levels  Pregnancy 1st trimester; grand yoni   Start prenatal vitamins, vitamin D3 2000 IU, and omega 3 fish oil daily   Follow up with midwives for first prenatal visit.    Avoid most other medications at this time except her synthroid.         Subjective:    Patient ID: Kenzie Peralta is a 41 y.o. female.    HPI comes for pregnancy confirmation, and  thyroid check. Patient states that when she is pregnant her TSH changes and she has to adjust her dose of synthroid. Patient states that she feels pregnant.  Feels increased fatigue throughout her day. LMP 2016, inconsistent use of condoms with her . Wasn't trying to get pregnant but also wasn't using consistent birth control. Has  . Doesn't typically get early pregnancy symptoms.  However she has had difficulties with her thyroid during her pregnancies.    Lastly patient has a concern about her abdominal bloating.  She was seen by Dr. Gomes in the fall who did a large workup of many lab tests allergy tests from possible came back for celiac disease/gluten allergy, CT scan was ordered and the patient went to have the CT done but then chickened out at the last moment and left the clinic without having the CT scan.  Today patient is concerned that maybe she should've had the this scan however she is pregnant now and so she is unable to have a CT scan.  She was wondering if there was another imaging test that could be checked on her while she is pregnant.  Patient could not remember what exactly Dr. Yoon had mentioned that she was checking for with the CT scan.  Patient does state that her bloating appears to be better when she avoids gluten.    The following portions of the patient's history were reviewed and updated as appropriate: allergies,  current medications, past family history, past medical history, past social history, past surgical history and problem list.    Review of Systems   Constitutional: Positive for fatigue.             Objective:    Physical Exam   Constitutional: She is oriented to person, place, and time. She appears well-developed and well-nourished.   HENT:   Head: Normocephalic.   Neck: Normal range of motion. Neck supple. No thyromegaly present.   Cardiovascular: Normal rate, regular rhythm and normal heart sounds.    Pulmonary/Chest: Effort normal and breath sounds normal.   Neurological: She is alert and oriented to person, place, and time. She has normal reflexes.   Nursing note and vitals reviewed.

## 2021-06-10 NOTE — PROGRESS NOTES
"Subjective:   Kenzie is here with her eldest child for a routine prenatal visit at 20w2d.  She has c/o fatigue - \"feels like her thyroid is off and MD should have increased her dose.\"  TSH was 2.4 a month ago.  She has been self medicating with an additional small amount of levothyroxine. \"breaking her old 88 mcg tabs into small pieces and adding this to the 125 mcg dose.\"  Also c/o hip and LBP, incontinence and vaginal pressure.  L side leg/thigh varicosisites have worsened and she is wearing her compression stocking on this side today..  Appetite is normal. Interval weight gain is appropriate.   She is feeling fetal movement regularly.  Patient is not at risk for pre-term labor. Does not want more children - santos declined to discuss BC in front of her teenager.    Objective:  See flowsheet.   Genetic testing declined    Assessment:  1. Hypothyroidism  TSH    T4, Free   2. Supervision of high-risk pregnancy of elderly multigravida, second trimester  prenatal no122-iron-folic acid (PRENATAL MULTI)  mg-mcg Tab       Plan:  1. Fetal survey ultrasound results reviewed today.   2. Will recheck hgb, TSH and free T4 today  3.  Anticipatory guidance, warning signs, when to call and CNM contact information reviewed.   4. Reviewed signs & symptoms of pre-term labor, relief measures and when to call.   5.  Palliative and preventative measures for hip/back/groin pain reviewed.  encouraged a maternity support belt and establishing care with a chiropractor.   6. Return to clinic in 4-5 weeks.     HECTOR Serra, TERRELL  5/19/2017 1:40 PM          "

## 2021-06-10 NOTE — PROGRESS NOTES
Here with 2yo son.  Feeling well.  Thinks she is feeling a few flutters.  Denies pain/bleeding.  Will recheck thyroid levels today.  Still feeling fatigued.  Would like rx for Ambien--often not sleeping well and this has worked well for her in the past.  Reviewed Cat C medication.  Rx given.  Discussed option for doxylamine which is Cat A in pregnancy.  Going on a cruise in a few weeks, letter given for earlier dinner option per pt request.  Referral for fetal survey given.  Discussed option of Level II US due to AMA.  Pt may check with insurance re: coverage but will likely schedule normal fetal survey.  Will notify CNM if she desires Level II US.

## 2021-06-11 NOTE — PROGRESS NOTES
Kenzie is here alone today.  Reports constipation that she has when not pregnant but worse during pregnancy.  Has increased water and fiber intake.  Recommended either Miralax or benefiber as well.  Occasionally has blood in stool from hemorrhoids.  Had levothyroxine increased and would like TSH today to see if dose is appropriate.  No s.s PTL or preE.  Active FM.  Still having varicose veins right left from calf to mid thigh.  Wearing compression stocking on the right leg.

## 2021-06-12 NOTE — PROGRESS NOTES
"Outpatient/Triage Note:    Patient Name:  Kenzie Peralta  :      1975  MRN:      [id number]    Assessment:   @ 36w6d.   Back pain  R/O  labor  Unchanged cervical exam from 17.    Plan:   -Observe in triage x2 hours. Re-evaluate SVE and if unchanged and patient feels contractions have subsided, plan to discharge to home.   -At approximately 1.5 hours of observation patient requesting to go home, stating \"nothing is happening and I want to go home\". Recommended she stay for another half hour or so to get a full observation but patient declining to stay longer.Recommended a repeat SVE prior to discharge and patient also declining.   -While patient was on the monitor, patient having a few contractions; patient states that she doesn't feel any of the ones that are being picked up on [place].   - Discharge to home undelivered. Reviewed warning signs including decreased fetal movement, leaking of fluid, vaginal bleeding, or signs of labor. Reviewed how to contact on-call CNM. Follow-up in clinic with CNM as scheduled or sooner as needed. All questions answered. Agrees with plan.   -Strongly, strongly re-iterated the importance of calling the CNM on call with any changes or feelings of labor, LOF, vaginal bleeding, changes in FM or any other concerns.     Subjective  Kenzie Peralta is a 41 y.o.  [place] [facility] [place] and to rule out  labor. The patient states that she noticed some back pain tonight that was just a general ache and fairly mild but noticeable. She states it didn't radiate anywhere. Onset approximately 3 hours ago. She denies any alleviating or aggravating factors. Denies any loss of fluids, vaginal bleeding, bloody show, mucus discharge. She notes normal fetal movement.    Patient states that she did notice some contractions but felt like they weren't really much stronger than the ones she's been having over the last week. She had " a few that she noticed were stronger, but now, after 1.5 hours of observation in triage, she feels like she hasn't had anymore and now feels like any contractions have subsided.     She does note some burning when she urinated yesterday. She denies any change in frequency or urgency or other urinary symptoms.       ROS:   Pertinent items are noted in the HPI, otherwise all others negative    Objective  Heart Rate:  [86] 86  BP: (100)/(65) 100/65    Physical Exam:  General appearance: Alert, cooperative, NAD, appears stated age, watching the news on the clean up on [place].  HEENT: Normocephalic, atraumatic, tongue midline, no deviated nasal septum, no conjunctivitis  Skin: Pink, warm & dry, not diaphoretic  Cardiovascular:  RRR, S1, S2, no extra sounds or murmurs, no edema  Respiratory:  Non labored respirations  Abdomen: soft, non-tender, gravid   EFW by leopolds: 7 lbs        Fetal Heart Rate:    Rate:Baseline Classification:  140 bpm  Variability:   moderate  Pattern:   +accelerations, no decelerations       Uterine Activity: (per RN documentation)  Mode: Monitor Mode: External, Palpation  Contraction frequency: Contraction Frequency (min): 9-10  Contraction duration:    Contraction quality:         G/U (Cervical Exam):        Normal external genitalia. No lesions. No trauma.  Dilation   3 cm  Effacement  40%  Cervical characteristics   moderate, posterior  Station  -3  Cervical characteristics )     Essentially unchanged SVE from exam on 17 by this same CNM.     Psych: AAO x4, normal speech, good eye contact    Past Medical, surgical, family and social history reviewed and not pertinent to this visit. Past obstetric history reviewed and is noted below:     OB History      Para Term  AB Living    7 5 5  1 5    SAB TAB Ectopic Multiple Live Births    1    5            Total time spent with patient: 30 mins, >50% time spent counseling and coordination of care.    Provider: Lakshmi Bustillos  TERRELL YOUNG    Date:  9/12/2017  Time:  10:01 PM

## 2021-06-12 NOTE — PROGRESS NOTES
"Kenzie Peralta is here by herself for a routine prenatal visit at 32w0d.  She has no concerns and is feeling well.  BH contractions---irregular, not painful.  She is feeling fetal movement regularly.  Denies vaginal bleeding/loss of fluid.  Appetite is normal. Interval weight gain is appropriate.   Discussed how to pre-register on our website after 30 weeks.  Attempted discussion regarding contraception: \"I don't want my tubes tied.  I'm not to worried about it (contraception).\"  Encouraged her to discuss further with CNM when/if she desires.  Declines Tdap. Anticipatory guidance, warning signs (with emphasis on  labor signs and symptoms), when to call and CNM contact information reviewed.  RTC in 2 weeks.  Waterbirth consent NV    "

## 2021-06-12 NOTE — PROGRESS NOTES
"Kenzie Peralta is here by herself for a routine prenatal visit at 34w0d.  She shares that she has been sick for the past week with a cold or \"flu\".  States she has had a sore throat with difficulty swallowing, has felt \"feverish\" at times (did not take her temperature), and tired.  Starting to feel better.  Discussed option for  evaluation at walk in if symptoms do not improve or worsen.  States \"I don't usually take medication\" (referring to strep throat). She requests an rx for Ambien.  She takes it occasionally for sleep with good effect.   Discussed Unisom as a more often/safely used medication during pregnancy.  She declines and requests Ambien.  Rx for 20 tablets provided.  She requests a maternity belt rx.  Does not want Jobst support, rather just the belly support.    Fetal movement is normal.  Denies vaginal bleeding/loss of fluid.    Interval weight gain is approriate. Reviewed weight gain chart with patient.  TdaP declined.  Plans to pre-register.. Discussed how to obtain a breast pump and she desires a prescription and will obtain on her own.   EPDS = 2 today.  No concerns about PPD; identifies a good support system.  Reviewed Group B strep vaginal & rectal culture and hemoglobin at one of her upcoming visits between 35-37 weeks.Reviewed and provided patient with handouts.   Anticipatory guidance, warning signs (with emphasis on  labor signs and symptoms), when to call and CNM contact information reviewed. RTC in 2 weeks.      "

## 2021-06-12 NOTE — PROGRESS NOTES
"Kenzie Peralta is a 41 y.o. female  at 36.0 weeks doing well. Here alone. Denies any warning s/sx. GFM. Has felt some BH like contractions but nothing reminding her of labor. She states \"I'm too tired and sick to go into labor right now\".  Notes questions about this continued cold that she has had over the last few weeks. She states she notes some swollen lymph nodes in her underarm area that come and go. She states she hasn't taken any Tylenol because \"i guess I just forget that I can take something\". She states she hasn't gone into urgent care or anything because it seems to be getting better. Encouraged her to go into a walk in clinic or an urgent care if things don't seem to be improving or if they get worse so that she can get better and improve her health.   She states that she has taken Ambien once to help her sleep and that was helpful. States that she doesn't want to take it \"too much\". States that she feels like she is so busy with her 5 other children and also has a bit of difficulty sleeping and Ambien helps. She states that she thinks that her lack of sleep isn't helping her feel better either.  Encouraged her to perhaps find even an afternoon or a night that she could go sleep at a parent's or friends house so that she could get a solid night of rest.   Otherwise is doing well. GBS and hemoglobin collected today. Also fredy TSH to re-evaluate thyroid. Patient currently taking Synthroid 137 mcg. States she was previously on 125 mcg and they moved her up to 137 mcg. She states that she feels 'good' on this dosage. Plan to collect TSH and make adjustments to her dosage if needed. VE today 3/-3, moderate, posterior.   Plan is to RTC 1 week.  "

## 2021-06-13 NOTE — ANESTHESIA PREPROCEDURE EVALUATION
Anesthesia Evaluation      Patient summary reviewed   No history of anesthetic complications     Airway   Mallampati: II  Neck ROM: full   Pulmonary - negative ROS                          Cardiovascular - negative ROS   Neuro/Psych      Comments: Hx migraine HA's    Endo/Other    (+) hypothyroidism, pregnant (Grand multipara)     Comments: Anemia with current pregnancy    GI/Hepatic/Renal    (+) GERD,             Dental                         Anesthesia Plan  Planned anesthetic: epidural  All risks were explained to the patient and spouse, including spinal headache, drug rxn, high spinal block, hypotension, epidural hematoma, epidural abscess and permanent neurologic injury. The patient and spouse understand the risks and wish to proceed with epidural labor analgesia.  ASA 2     Anesthetic plan and risks discussed with: patient and spouse  Anesthesia plan special considerations: antiemetics,   Post-op plan: routine recovery

## 2021-06-13 NOTE — ANESTHESIA PROCEDURE NOTES
Epidural Block    Patient location during procedure: OB  Time Called: 9/24/2017 4:24 AM  Reason for Block:labor epidural  Staffing:  Performing  Anesthesiologist: МАРИНА CRAIG A  Preanesthetic Checklist  Completed: patient identified, risks, benefits, and alternatives discussed, timeout performed, consent obtained, at patient's request, airway assessed, oxygen available, suction available, emergency drugs available and hand hygiene performed  Procedure  Patient position: sitting  Prep: ChloraPrep and site prepped and draped  Patient monitoring: continuous pulse oximetry, heart rate and blood pressure  Approach: midline  Location: L2-L3  Injection technique: YOGI air  Number of Attempts:1  Needle  Needle type: Williams   Needle gauge: 18 G     Catheter in Space: 3  Assessment  Sensory level:  No complications      Additional Notes:  All risks of a labor epidural were explained to the patient. The patient understands the risks of a labor epidural and wishes to proceed.

## 2021-06-13 NOTE — H&P
"Outpatient/Triage Note:    Patient Name:  Kenzie Peralta  :      1975  MRN:      [id number]      Assessment:  1.  41-year-old  with IUP at 37 weeks 5 days  2.  Abdominal cramping, not in labor  3.  Fetal heart rate category 1  4.  Hypothyroidism  5.  AMA  6.  Anemia    Plan:   -Nonstress test and vital signs.  -SVE at 8:30 PM.  -Recommend p.o. hydration and ambulation.  -Repeat SVE at 10 PM or sooner as needed.  -Offered therapeutic rest under observation status versus home with term labor precautions.  Patient opts to go home.  -Recommend warm hydrotherapy.  -Declines Vistaril p.o. for home; states she may take generic Ambien prescribed previously.  Discussed this medication is a hypnotic but is considered safe.  -Discharge to home undelivered.   -Reviewed warning signs including decreased fetal movement, leaking of fluid, vaginal bleeding, or signs of labor.   -Encouraged to continue prenatal vitamin, iron rich foods and iron supplementation.    -Explained that patient will require postpartum hemorrhage risk labs as well as an IV start on admission to the hospital for labor considering multiparity >5 and hemoglobin <10 g/dL.  -Reviewed how to contact on-call CNM.    -Encouraged to call with any questions, concerns, or as needed.  -Follow-up in clinic with CNM as scheduled or sooner as needed.   -All questions answered. Agrees with plan.     Subjective:  Kenzie Peralta is a 41 y.o. G 7 p 5015 at 37+5 weeks, with an EDC of 10/14/2017 who presented to [facility] for evaluation of uterine contractions which started at about 7 PM.  \"They were every 5 minutes ×8 contractions.  With a history of very fast labors, I wanted to come in for evaluation.\"  Denies vaginal bleeding, loss of fluid or decreased fetal movement.  May desire water birth.  No history of postpartum hemorrhage.    Objective:  Vital signs: /72 (Patient Position: Semi-martins, Cuff Size: Adult Regular)  Pulse 76  " "Temp 98.3  F (36.8  C) (Oral)   Resp 16  Ht 5' 7\" (1.702 m)  Wt 150 lb (68 kg)  LMP 12/28/2016 (Exact Date)  BMI 23.49 kg/m2  FHR: 120s-130s, moderate variability, accelerations present, decelerations absent  Uterine contractions: Initially every 5-8 minutes then becoming irregular near the second SVE.    Physical Exam:   Abdomen: SIUP, cephalic presentation by Leopold's, abdomen non-tender.  Uterine contractions palpate mild.  SVE: At 2030: 3 cm/60%/-2, soft, posterior, cephalic presentation with head ballotable.  2200 hrs. SVE essentially unchanged.  Sterile speculum exam:  Legs: Mild pretibial and pedal edema, lower extremity varicosities right greater than left side, moves all freely    Temp:  [98.3  F (36.8  C)] 98.3  F (36.8  C)  Heart Rate:  [76-90] 76  Resp:  [16] 16  BP: (104-117)/(62-72) 117/72  No intake or output data in the 24 hours ending 09/18/17 9670      Provider: HECTOR Mondragon, TERRELL    TT with patient 40 mn >50% time spent counseling.          "

## 2021-06-13 NOTE — ANESTHESIA POSTPROCEDURE EVALUATION
Patient: Kenzie Peralta  * No procedures listed *  Anesthesia type: epidural    Patient location: Labor and Delivery  Last vitals:   Vitals:    09/24/17 1500   BP: 97/51   Pulse: 67   Resp: 16   Temp: 37  C (98.6  F)   SpO2:      Post vital signs: stable  Level of consciousness: awake and responds to simple questions  Post-anesthesia pain: pain controlled  Post-anesthesia nausea and vomiting: no  Pulmonary: unassisted, return to baseline  Cardiovascular: stable and blood pressure at baseline  Hydration: adequate  Anesthetic events: no    QCDR Measures:  ASA# 11 - Pamela-op Cardiac Arrest: ASA11B - Patient did NOT experience unanticipated cardiac arrest  ASA# 12 - Pamela-op Mortality Rate: ASA12B - Patient did NOT die  ASA# 13 - PACU Re-Intubation Rate: NA - No ETT / LMA used for case  ASA# 10 - Composite Anes Safety: ASA10A - No serious adverse event    Additional Notes:    Patient is comfortable s/p labor epidural. Patient did not receive adequate labor analgesia from epidural. Attempted to place ITN but procedure was abandoned at patient request and she delivered minutes later. Patient is recovering well. Patient's sensory and motor function in LE are returned to baseline, emptying bladder as expected, minimal to no tenderness at neuraxial insertion site. Advised patient to alert her nurses, physicians, department of anesthesia if anything changes.      Albina Mata MD  Staff Anesthesiologist  [facility]

## 2021-06-13 NOTE — PROGRESS NOTES
No cervical change, per TERRELL Aguero. TERRELL discussed with patient and  options to either stay here, and be slept, or go home, take a warm bath , and sleep. Patient and  will discuss options, and let us know their plan

## 2021-06-13 NOTE — PROGRESS NOTES
Kenzie Peralta presents to clinic today for a routine prenatal visit.   Feeling okay, uncomfortable.  Anxious about when labor is going to occur, had her last baby at 37 3/7, so feeling really ready.  Has plans made for the rest of the week as far as having someone around to drive her and to care for her other children, feels like it is going to happen soon.  Very aware of her body/contractions, wanting to get to the hospital at the right time. Discussed low hgb and concerns related to that getting close to time of delivery.  Patient states that she has been eating more red meat, doesn't want to take an iron supplement d/t constipation.  Encouraged patient to consider taking a supplement, discussed Floridix, patient will consider.  Reports + fetal movement daily.  Having contractions on an off, denies bleeding or LOF. Reports normal appetite and is hydrating adequately.  Warning sxs and when to call reviewed, patient has CNM contact information.  Anticipatory guidance re: end of pregnancy provided today.

## 2021-06-13 NOTE — ANESTHESIA PROCEDURE NOTES
Spinal Block    Patient location during procedure: OB  Start time: 9/24/2017 8:12 AM  End time: 9/24/2017 8:19 AM  Reason for block: at surgeon's request    Staffing:  Performing  Anesthesiologist: ALBINA MATA    Preanesthetic Checklist  Completed: patient identified, risks, benefits, and alternatives discussed, timeout performed, consent obtained, at patient's request, airway assessed, oxygen available, suction available, emergency drugs available and hand hygiene performed  Spinal Block  Patient position: sitting  Prep: ChloraPrep  Patient monitoring: heart rate, continuous pulse ox and blood pressure  Approach: midline  Location: L2-3  Injection technique: single-shot  Needle type: Quincke   Needle gauge: 24 G      Additional Notes:      Attempted to place ITN for labor pain relief. Patient very uncomfortable and unable to maintain required positioning. After 2 attempts, patient asked me to stop procedure. Planned for 25 mcg fentanyl. 100 mcg fentanyl wasted by L&D nursing staff.    Albina Mata MD  Staff Anesthesiologist  [facility]

## 2021-06-13 NOTE — PROGRESS NOTES
Patient presents to [facility] with report of contractions for one hour.  History of rapid deliveries. In bed with EFM on. Rates pain at 2-3.

## 2021-06-14 NOTE — PROGRESS NOTES
LIZANDRO@Patient ID: Kenzie Peralta is a 41 y.o. female.  Assessment:   Normal postpartum exam at ~6 weeks postpartum.   lactating   Rectocele  AMA  Grand multiparity  Hypothyroidism    Plan:    1. Pap smear not done at today's visit. Due for annual Gyn exam in  2020.  Thyroid cascade ordered per pt request  2. Desired contraception: none. Declines discussion of options.  3. Discussed resumption of exercise and setting a goal to return to pre-pregnancy weight in the next 6-12 months.   4.  Discussed her plan re: rectocele.  She will be in touch with pelvic floor clinic to revisit her non-surgical options.  Would also like a referral to Ob/GYN to discuss surgical options.  Referral placed for [facility]  5. Adjustment to parenting, self care and open communication with her support system discussed. Warning signs and symptoms related to postpartum mood disorders reviewed.   6.  Discussed option for mammogram when patient stops nursing.  She declines this option.  Prefers a screening ultrasound.  This CNM to look into the best timing for this--while nursing vs after baby has weaned  6.  RTC in 1 year for annual exam    Total time spent with patient 40 minutes, >50% counseling, education and coordination of care.  HECTOR Goldsmith, CNM    Subjective:     Kenzie Peralta is a 41 y.o. female who presents for postpartum visit. She is 6 weeks postpartum following a NSVB.  I have fully reviewed the prenatal and intrapartum course. The delivery was at 38w4d.   weighed 8 lbs 4 oz at birth.     Postpartum course has been stable. Baby's course has been stable. Baby is feeding breast.  Lochia ceased at 1 week postpartum.  Bowel function--constipation per her normal. Shares that she feels this is due to rectocele.  She had a full evaluation prior to this pregnancy with pelvic floor clinic.  Also tries to eat a non-constipating diet.  Bladder function is normal. She has not resumed intercourse. Desired  "contraception: none. Though feels finished with having children.  \"We wouldn't be sad if it (pregnancy) happened,\"  No plans for contraception since \"my mother started menopause at 38.  So I don't have long.\"  [place] postpartum depression screening score: 4. She has resumed someexercise. Patient is a SAHM.    Review of Systems  General:  Denies problem  Eyes: Denies problem  Ears/Nose/Throat: Denies problem  Cardiovascular: Denies problem  Respiratory:  Denies problem  Gastrointestinal:  constipation  Genitourinary: Denies problem  Musculoskeletal:  Denies problem  Skin: Denies problem  Neurologic: Denies problem  Psychiatric: Denies Problem  Endocrine: Denies problem    Objective:         Physical Exam:  General Appearance: Alert, cooperative, no distress, appears stated age  Skin: Skin color, texture, turgor normal, no rashes or lesions  Throat: Lips, mucosa, and tongue normal; teeth and gums normal  HEENT: grossly normal; otoscopic and opthalmic exam not performed.   Neck: Supple, symmetrical, trachea midline, no adenopathy;  thyroid: not enlarged, symmetric, no tenderness/mass/nodules  Lungs: Clear to auscultation bilaterally, respirations unlabored  Breasts: No breast masses, tenderness, asymmetry, or nipple discharge.  Heart: Regular rate and rhythm, S1 and S2 normal, no murmur, rub, or gallop  Abdomen: Soft, non-tender. Diastasis Recti: 1.5 FBs  Pelvic:External genitalia normal without lesions or irritation. Vagina and cervix show no lesions, inflammation, discharge or tenderness. 1st degree perineal laceration well approximated and well healed.   No cystocele.  Rectocele noted with valsalva; bulge noted just inside the introitus.  Uterus fully involuted.  No adnexal mass or tenderness.  Extremities: Extremities normal without varicosities or edema       Last Pap: 10/5/15. Results were: normal  Immunization History   Administered Date(s) Administered     Influenza, seasonal,quad inj 6-35 mos 11/12/2008     " Td,adult,historic,unspecified 06/24/2009     Tdap 06/24/2009     Immunization status: documented

## 2021-06-15 PROBLEM — Z64.1 GRAND MULTIPARA: Status: ACTIVE | Noted: 2017-02-20

## 2021-06-19 NOTE — PROGRESS NOTES
Assessment:     1. De Quervain's disease (tenosynovitis)     2. Hypothyroid  levothyroxine (SYNTHROID) 100 MCG tablet    Thyroid Cascade    T4, Free        Wrist tendonitis on both sides   Brace for the right hand also provided    Discussed suppressed TSH over the last 2 visits.  Patient is to reduce her levothyroxine.  I am going to refer at previous dose for now and results will be forwarded to PCP.  Discussed the harm of overtreatment.  Patient's BMI is at 19.    Plan:      Natural history and expected course discussed. Questions answered.  Educational materials distributed.  Resr, ice, compression, and elevation (RICE) therapy.  Reduction in offending activity discussed.     Subjective:      Kenzie Peralta is an 42 y.o. female who presents for evaluation of left wrist pain. Onset was gradual, starting about 1 month ago. The pain is mild, moderate, worsens with movement, and is relieved by rest. There is no associated numbness, tingling, weakness in left wrist. There is no history of injury. Evaluation to date: plain films: normal. Treatment to date: wrist splint which is somewhat effective.    She was seen at urgent care on 0 618 and diagnosed with tendinitis and given a wrist brace.  She feels that because of overuse of her right arm now her right wrist is hurting.  She does have an infant which she informs me is fairly heavy and she is getting him.  Reviewed x-rays from urgent care that are as follows    XR WRIST 3 VIEWS LEFT  6/18/2018 8:00 PM    INDICATION: Left wrist pain  COMPARISON: None.    FINDINGS: Negative left wrist. No fracture or dislocation.    In addition she is also here for her thyroid blood testing ordered by her PCP Dr. Yoon.  She would like a refill of her levothyroxine is she is down to the last pill.      The following portions of the patient's history were reviewed and updated as appropriate: allergies, current medications, past family history, past medical history, past social  history, past surgical history and problem list.  No Known Allergies    Current Outpatient Prescriptions on File Prior to Visit   Medication Sig Dispense Refill     docusate sodium (COLACE) 100 MG capsule Take 1 capsule (100 mg total) by mouth daily.  0     acetaminophen (TYLENOL) 325 MG tablet Take 1-2 tablets (325-650 mg total) by mouth every 4 (four) hours as needed.  0     ibuprofen (ADVIL,MOTRIN) 800 MG tablet Take 1 tablet (800 mg total) by mouth every 8 (eight) hours.  0     prenatal no122-iron-folic acid (PRENATAL MULTI)  mg-mcg Tab Take 1 tablet by mouth once daily. 90 tablet 3     No current facility-administered medications on file prior to visit.        Patient Active Problem List   Diagnosis     Varicose Veins     Hypothyroidism     Hemorrhoid     Rectocele     Grand multipara     Abdominal cramping       Past Medical History:   Diagnosis Date     Acne     Created by Conversion      Anemia     current pregnancy, Sept 2017     Bright Red Blood Per Rectum     Created by Conversion      Hypothyroid      Migraine      Skin Neoplasm Of Uncertain Behavior     Created by Conversion      Urinary tract infection      Varicella      Varicosities of leg        Past Surgical History:   Procedure Laterality Date     BREAST BIOPSY  2009     WISDOM TOOTH EXTRACTION         Family History   Problem Relation Age of Onset     Diabetes Mother      Hypothyroidism Mother      Hypothyroidism Maternal Grandmother      No Medical Problems Father      No Medical Problems Sister      No Medical Problems Brother      No Medical Problems Daughter      No Medical Problems Son      Cancer Maternal Grandfather      Hypothyroidism Maternal Grandfather      Dementia Paternal Grandmother      No Medical Problems Daughter      No Medical Problems Son      No Medical Problems Son        Social History     Social History     Marital status:      Spouse name: Fran     Number of children: 6     Years of education: college grad      Occupational History     homemaker      Social History Main Topics     Smoking status: Never Smoker     Smokeless tobacco: Never Used     Alcohol use No     Drug use: No     Sexual activity: Yes     Partners: Male     Birth control/ protection: None     Other Topics Concern     None     Social History Narrative       Review of Systems  Constitutional: negative  Integument/breast: negative  Hematologic/lymphatic: negative     Objective:      BP 99/65 (Patient Site: Right Arm, Patient Position: Sitting, Cuff Size: Adult Regular)  Pulse 76  Resp 18  Wt 123 lb 4.8 oz (55.9 kg)  SpO2 98%  Breastfeeding? Yes  BMI 19.31 kg/m2  Right wrist:  remainder of ipsilateral wrist, hand and finger exam is normal and Tenderness on the radial side of the thumb and Fleckenstein is positive   Left wrist:  soft tissue tenderness and swelling at the On the radial side below the thumb and Finkelstein is positive     General Appearance: healthy, alert, oriented and no distress  HEENT Exam: Oropharynx clear without lesion or exudate  Neck:  supple, no adenopathy, thyroid normal  Heart: Normal heart sounds, S1 and S2, No murmurs.  Lungs: Normal breath sounds, Clear to auscultation bilateral  Skin exam: No visible or palpable abnormalities  Neurological exam: gait normal, alert and oriented X 3  Hem/Lymph/Immuno exam: negative findings:  no cervical nodes, no supraclavicular nodes,

## 2021-06-20 NOTE — PROGRESS NOTES
Assessment/Plan:        Diagnoses and all orders for this visit:    Hypothyroidism in adult  -     Thyroid Cascade  -     Glycosylated Hemoglobin A1C  -     HM1(CBC and Differential)  -     HM1 (CBC with Diff)    Lower back pain  -     Ambulatory referral to [facility]    consider PT. Briefly talked about minimizing processed foods, sugars, carbohydrates (low carb/high fat) diet for diabetes prevention.         Subjective:    Patient ID: Kenzie Peralta is a 42 y.o. female.    HPI Patient is here for med check of levothyroxine and lower back pain  Hypothyroidism: TSH has been to low so Dr Yoon reduced patient's dose to 88 mcg of Levothyroxine. Patient hasn't noticed any side effects or changes in how she feels. Will recheck TSH today. Patient states that she would like to be checked for diabetes. Her mother has insulin dependent type 2 diabetes.      Lumbar back pain: pain is centrally located in lumbar back has been ongoing for past 19 years. Intermittent; bending down and standing long periods of time are triggers. Coughing, laughing, or sneezing do no make it worse. No radiation of pain or numbness down legs. No changes in bowel or bladder incontinence recently, no history of cancer or fevers.     The following portions of the patient's history were reviewed and updated as appropriate: allergies, current medications, past family history, past medical history, past social history, past surgical history and problem list.    Review of Systems   Constitutional: Negative.    HENT: Negative.    Eyes: Negative.    Respiratory: Negative.    Cardiovascular: Negative.    Gastrointestinal: Negative.    Endocrine: Negative.              Objective:    Physical Exam  BP 94/64  Pulse 81  Resp 16  Wt 122 lb 6.4 oz (55.5 kg)  BMI 19.17 kg/m2  General Appearance:  Alert, cooperative, no distress, appears stated age   Head:  Normocephalic, without obvious abnormality, atraumatic   Eyes:  PERRL, conjunctiva/corneas clear,  EOM's intact   Ears:  Normal TM's and external ear canals, both ears   Nose: Nares normal, septum midline, mucosa normal, no drainage   Throat: Lips, mucosa, and tongue normal; teeth and gums normal   Neck: Supple, symmetrical, trachea midline, no adenopathy, thyroid: not enlarged, symmetric, no tenderness/mass/nodules   Back:   Symmetric, no curvature, some pain with  ROM  especially with flexion, no pain with palpation of spine and paraspinal muscles, straight leg test is negative bilaterally, no CVA tenderness   Lungs:   Clear to auscultation bilaterally, respirations unlabored   Chest Wall:  No tenderness or deformity   Heart:  Regular rate and rhythm, S1, S2 normal, no murmur, rub or gallop   Extremities: Extremities normal, atraumatic, no cyanosis or edema, sensation intact on all sides of bilateral legs and feet.   Skin: Skin color, texture, turgor normal, no rashes or lesions   Lymph nodes: Cervical and supraclavicular normal   Neurologic: Normal,Coordinated, smooth gait, balance, rapid alternating movements, sensory functioning, and cranial nerves II-XII grossly intact. DTR's+2 bilaterally knee, ankle.

## 2021-06-20 NOTE — PROGRESS NOTES
ASSESSMENT: Kenzie Peralta is a 42 y.o. female  with a BMI of Body mass index is 18.79 kg/(m^2). with past medical history significant for hypothyroidism who presents today for new patient evaluation of chronic low back pain of approximately 19 years duration that started after the birth of her first baby.  The patient is etiology of her low back pain is difficult to determine at this time.  It may be discogenic pain.  Pelvic floor weakness/dysfunction may play a role in her back pain as well.  She is without radicular symptoms at this time.  Patient does report a 10 pound unexpected-unintentional weight loss within the last 2-3 months.  This is concerning for oncological process.  She is neurologically intact.    JOSE: 12%  WHO-5: 21 (the patient is not interested in behavioral services)    PLAN:  A shared decision making model was used.  The patient's values and choices were respected.  The following represents what was discussed and decided upon by the physician and the patient.      1.  DIAGNOSTIC TESTS: The patient is not had any imaging.  Given her unexpected/unintentional weight loss in the last few months, an MRI of the low back is ordered.  The patient will be contacted with the results via [employer] (her preferred contact method).  If she has not heard from the spine center within 2 days of having her MRI, she is asked to contact the physician either via the nurse line or via [employer].  2.  PHYSICAL THERAPY: Recommended that the patient start physical therapy to work on strengthening, stretching, range of motion exercises.  The patient was agreeable to going so an order was provided to the [facility] rehab.  The therapist is also asked to work with her on pelvic floor strengthening exercises.  3.  MEDICATIONS: Discussed a prescription anti-inflammatory medication with the patient.  She was wanting to try this so nabumetone 500 mg 1 tablet every 8 hours as needed for pain was prescribed today.  She is  asked to take this medication with food/water to prevent any stomach upset.  She is asked to refrain from taking ibuprofen/Advil/Aleve/naproxen while she takes nabumetone.  -Patient requested a small supply of Ambien to help her sleep at night.  Ambien 5 mg 1 tablet p.o. nightly as needed insomnia was prescribed today.  Number 5 tablets with no refills was given.  A  check was run given that this is a controlled substance.  The patient was told that for future prescriptions of Ambien, she will need to receive those from her primary care physician.  4.  INTERVENTIONS: Injections were not discussed at this time as she should try conservative treatment before considering injections.  5.  PATIENT EDUCATION:   -Discussed that it is difficult to know exactly what structure in her back is causing her pain.  Discussed that physical therapy though can help with numerous etiologies of back pain.  Discussed how her pelvic floor could be playing a role in her back pain.  -Reassured the patient that her bowel and bladder dysfunction go along more with pelvic floor dysfunction rather than an issue from the low back.  -The patient had one episode of chest pain, but it was related to significant anxiety/stress.  She is still encouraged to discuss this with her primary care physician so that this can be monitored closely.  Plan to the patient that since she does have a family member who has heart disease, will be important for her primary care provider to know that she has had this chest pain to rule out any cardiac issues (though this seems most likely due to her anxiety/stress and she reports she has not had any further episodes).  -All of her questions were answered to her satisfaction today.  She was in agreement with the treatment plan.  6.  FOLLOW-UP:   Patient will be contacted with the results of her MRI.  At that time status regarding physical therapy can be checked as well.  Further follow-up can be determined based on  the results of her MRI and where she is at with physical therapy.  She is encouraged to contact the clinic with any questions or concerns.  She was given the nurse navigation phone number, but also encouraged to contact the physician via [employer], as this is her preferred method of communication.      SUBJECTIVE:  Kenzie Peralta  Is a 42 y.o. female who presents today for new patient evaluation of low back pain the patient reports that she has had this pain for approximately 19 years.  It started after her first baby.  Has had 5 other children (total of 6 children).  She had vaginal deliveries with all of her children.  She does not remember if she had back problems prior to her first child.  She is coming to be evaluated today because the pain is limiting her daily activities.  The pain is located in the center of her low back.  She describes it as a constant, dull pain.  It does not radiate into her legs.  She denies any numbness tingling or weakness in the legs.  It is worse with standing, even for a few minutes.  The patient reports that she can do the dishes.  Standing at [employer] to talk to people is very difficult secondary to the pain.  She states that when she stands, her back will lock up and then it makes it very hard to walk or sit.  Typically though, she does feel better with sitting down or lying down.      As far as treatment, the patient saw a chiropractor about a year ago.  It did not provide any significant relief.  The patient has never undergone any physical therapy.  She has not tried any alternative treatment such as acupuncture.  She is never undergone any spine injections or had any spine surgeries.  She is not taking any prescription or over-the-counter pain medications at this time.  She states that she has headaches and she tries to save her medication for her headaches.    She states that her primary care provider told her to mention her bowel and bladder issues.  Patient reports that she  has significant constipation.  She struggles to have regular bowel movements.  However there are episodes where she needs to go immediately.  She did report one episode of stool incontinence, though this was very shortly after the birth of her last child proximally a year ago.  Patient states that she does have some bladder leakage.  This typically only occurs with working out or with vigorous activity.  She is concerned because she will go to the bathroom immediately before working out, and then immediately upon starting working out she will have the leakage.  As long as she wears a pad, she is okay.  She does not noticed any other bladder leakage with other activities.    Medications:  Reviewed and correct in the chart.      Allergies: Reviewed and NKDA per patient.    PMH:  Reviewed and significant for hypothyroidism.    PSH:  Reviewed and significant for wisdom tooth extraction, breast biopsy.    Family History:  Reviewed and significant for diabetes in her mother, heart disease in her grandfather.    Social History: The patient is  with 6 children.  She denies any tobacco, alcohol, illicit drug use.    ROS: Positive for chest pain/pressure related to stress.  Positive for changes in bowel movement/constipation.  Positive for back pain as stated in the HPI.  Positive for headaches which occur approximately every other day and she is able to manage on her own.  Specifically negative for bowel/bladder dysfunction, fevers,chills, appetite changes, unexplained weight loss.   Otherwise 13 systems reviewed are negative.  Please see the patient's intake questionnaire from today for details.      OBJECTIVE:  PHYSICAL EXAMINATION:    CONSTITUTIONAL:  Vital signs as above.  No acute distress.  The patient is well nourished and well groomed.  PSYCHIATRIC:  The patient is awake, alert, oriented to person, place, time and answering questions appropriately with clear speech.    SKIN:  Skin over the face, bilateral  lower extremities, and posterior torso is clean, dry, intact without rashes.    GAIT:  Gait is non-antalgic.  The patient is able to heel and toe walk without significant difficulty.    STANDING EXAMINATION: Patient has tenderness over the interspinous space at the L5-S1 level and at the L4-5 interspace.  There are no tenderness over the lumbar paraspinal muscles.  Range of motion of the lumbar spine is largely normal with flexion and extension.  She denies any significant pain with lumbar range of motion testing.  MUSCLE STRENGTH:  The patient has 5/5 strength for the bilateral hip flexors, knee flexors/extensors, ankle dorsiflexors/plantar flexors, great toe extensors, ankle evertors/invertors.  NEUROLOGICAL:  2/4 patellar, medial hamstring, and achilles reflexes bilaterally.  Equivocal Babinski's bilaterally.  No ankle clonus bilaterally. Sensation to light touch is intact in the bilateral L4, L5, and S1 dermatomes.  VASCULAR:  2/4 dorsalis pedis pulses bilaterally.  Bilateral lower extremities are warm.  There is no pitting edema of the bilateral lower extremities.  ABDOMINAL:  Soft, non-distended, non-tender throughout all quadrants.  No pulsatile mass palpated in the left lower quadrant.  LYMPH NODES:  No palpable or tender inguinal/popliteal lymph nodes.  MUSCULOSKELETAL: Negative straight leg raising test bilaterally.  Patient does not have any pain with hip range of motion testing with the hip in the flexed position testing internal and external rotation.  Fabere's test is negative bilaterally.    RESULTS:  No imaging studies.

## 2021-06-21 NOTE — PROGRESS NOTES
[facility] Daily Progress     Patient Name: Kenzie Peralta  Date: 10/30/2018  Visit #:   Referral Diagnosis:Chronic low back pain without sciatica  Referring provider: Susan Be  Visit Diagnosis:     ICD-10-CM    1. Chronic bilateral low back pain without sciatica M54.5     G89.29          Assessment:     Patient has documented cystocele and rectocele, and has very poor pelvic floor endurance noted today on sEMG.    Goal Status:  Pt. will be independent with home exercise program in : 4 weeks  Patient will stand : 30 minutes;with less pain;for home chores;in 4 weeks      Plan / Patient Education:     Continue with initial plan of care.   Add MT for pelvis fascia.    Subjective:     Pain Ratin   Dull ache, and it often feels like I fell on my buttocks- but I didn't.  I have a rectal prolapse of 3/4 and 4/4 with the other prolapse.  I feel at times like I can't have a BM, and every few months I actually have incontinence of feces.  I do feel I am improving in the last months- baby was born 13 months ago.  I was wearing a pad, but not now. If I do any vigorous activity, such as running I do wear a pad and restrict my drinking. I can hold my bladder 2-4 hours, depending on how much fluids I take. If I cough or laugh, I can be incontinent.        Objective:     Patient consent to use of sEMG vamshi rectally to assess pelvic floor overall strength and recruitment in various positions and endurance.  Patient had best pelvic floor contraction intensity during supine in hooklying, hooklying with use of ball for adductor contraction of the hips, and when standing.  Her first contraction was the best and she was able to sustain 28 to 51 mA for over 8 seconds, but after this she preceded to get increasingly less intensity and for shorter duration until aft 6 reps she was only able to get to 22 mA at best, but varied from 10-22 MA.   Changing position to hooklying did increase intensity, and  also in standing she was able to increase to 12-20 MA.    In sitting she has only able to obtain 8-15 mA at best, and so patient discouraged from exercising in sitting for now.    Resting pelvic tone was 1.2 to 2.4 MA and was normal.    Treatment Today    TREATMENT MINUTES COMMENTS   Evaluation     Self-care/ Home management     Manual therapy     Neuromuscular Re-education     Therapeutic Activity     Therapeutic Exercises 45 Discussed caffeinated beverage effect on bladder sensitivity, use of manual therapy to treat the pelvis, and used sEMG in multiple positions and tested absolute recruitment and endurance to arrive at neec for 5 second contraction- 6 reps 2 x.day, Told patient our goals is 30+ reps/day both long and short Kegels  Exercises:  Exercise #1: plie  Comment #1: supine: pelvic tilt, knee- chest, double knee-chest, with knees bend hip add/abd, provided written instructions  Exercise #2: Kegel's long contractions  Comment #2: 5 secinds, 6 reps, 2x/day and increase to 10 reps- 2x/day  Exercise #3: Kegel's short contractions  Comment #3: 1 second, 12 or more /day    Gait training     Modality__________________             Patient 5 minutes late , then had to use the restroom. Patient did ask questions at the end and did run past her allotted time   Total 45    Blank areas are intentional and mean the treatment did not include these items.       Chacha Renee  10/30/2018

## 2021-06-21 NOTE — PROGRESS NOTES
"[facility]   [place]Pelvic Initial Evaluation    Patient Name: Kenzie Peralta  Date of evaluation: 10/17/2018  Referral Diagnosis: Chronic low back pain without sciatica  Referring provider: Susan Be*  Visit Diagnosis:     ICD-10-CM    1. Chronic bilateral low back pain without sciatica M54.5     G89.29        Assessment:       Kenzie Peralta is a 42 y.o. female who presents to therapy today with chief complaints of left pelvic pain,leaking of urine. Onset date of sx was 2 months ago.  Pt reported h/o prolapse and multiple birth , 6 children.  Pain symptoms are 0-3/10.  Functional impairments include standing to vist/doing dishes/cutting hair.  Pt demo's signs and sx consistent with pelvic floor weakness.     Insurance: SD    1/12       Pt. is appropriate for skilled PT intervention as outlined in the Plan of Care (POC).  Pt. is a good candidate for skilled PT services to improve pain levels and function.      Diagnoses and all orders for this visit:    Chronic bilateral low back pain without sciatica       Goals:  Pt. will be independent with home exercise program in : 4 weeks  Patient will stand : 30 minutes;with less pain;for home chores;in 4 weeks  No Data Recorded    Patient's goals:\" place to visit\".    A shared decision making plan was used. The patient's values and choices were respected.  The following represents what was discussed and decided upon by the provider and the patient.     Patient's expectations/goals are realistic.    Barriers to Learning or Achieving Goals:  Chronicity of the problem.       Plan / Patient Instructions:        Plan of Care:   Authorization / Certification Start Date: 10/17/18  Authorization / Certification End Date: 12/16/18  Authorization / Certification Number of Visits: 12  Communication with: Referral Source  Patient Related Instruction: Nature of Condition;Treatment plan and rationale;Self Care instruction;Basis of treatment;Body " mechanics;Posture;Precautions;Next steps;Expected outcome  Times per Week: 2  Number of Weeks: 6  Number of Visits: 12  Discharge Planning: patient will be discharge to self care when goals met.    Plan for next visit: patient is referred to a pelvic  therapist.Patient will continue with initial pelvic exercises and core exercises.     Subjective:         Social information:   Living Situation:single family home, lives with others  and mother of 6.   Occupation:homemaker   Work Status:NA   Equipment Available: None    History of Present Illness:    Kenzie is a 42 y.o. female who presents to therapy today with complaints of back pain and liking of urine. Date of onset/duration of symptoms is after several pregnancies. Onset was gradual. Symptoms are intermittent and not improving. She reports  an episodic  history of similar symptoms. She describes their previous level of function as not limited    Pain Ratin  Pain rating at best: 0  Pain rating at worst: 3  Pain description: weakness    Functional limitations are described as occurring with:   standing.    Patient reports benefit from:  rest           Objective:      Note: Items left blank indicates the item was not performed or not indicated at the time of the evaluation.    Patient Outcome Measures :    No Data Recorded   Scores range from 0-100%, where a score of 0% represents minimal pain and maximal function. The minimal clinically important difference is a score reduction of 12%.    Examination  1. Chronic bilateral low back pain without sciatica       Involved side: Bilateral  Posture Observation:      General sitting posture is  normal.  General standing posture is normal.    Lumbar ROM:    Date: -18     *Indicate scale AROM AROM AROM   Lumbar Flexion pain     Lumbar Extension       Right Left Right Left Right Left   Lumbar Sidebending         Lumbar Rotation         Thoracic Flexion      Thoracic Extension      Thoracic Sidebending          Thoracic Rotation           Lower Extremity Strength:     Date: 10-17-18     LE strength/5 Right Left Right Left Right Left   Hip Flexion (L1-3)  4-/5 clickintg       Hip Extension (L5-S1)         Hip Abduction (L4-5)         Hip Adduction (L2-3)         Hip External Rotation         Hip Internal Rotation         Knee Extension (L3-4)         Knee Flexion         Ankle Dorsiflexion (L4-5)         Great Toe Extension (L5)         Ankle Plantar flexion (S1)         Abdominals 4-/5       Sensation    Intact.      Lumbar Special Tests:     Lumbar Special Tests Right Left SI Tests Right  Left   Quadrant test   SI Compression     Straight leg raise - - SI Distraction     Crossover response   POSH Test     Slump - - Sacral Thrust     Sit-up test  FADIR     Trunk extensor endurance test  Resisted Abduction     Prone instability test  Other:     Pubic shotgun  Other:           Treatment Today     TREATMENT MINUTES COMMENTS   Evaluation 30 low   Self-care/ Home management     Manual therapy     Neuromuscular Re-education     Therapeutic Activity     Therapeutic Exercises 15 Exercises:  Exercise #1: plie  Comment #1: supine: pelvic tilt, knee- chest, double knee-chest, with knees bend hip add/abd, provided written instructions     Gait training     Modality__________________                Total 45    Blank areas are intentional and mean the treatment did not include these items.     PT Evaluation Code: (Please list factors)  Patient History/Comorbidities: prolapse, multiple births.  Examination: pelvic floor weakness.  Clinical Presentation: stable.  Clinical Decision Making: low.    Patient History/  Comorbidities Examination  (body structures and functions, activity limitations, and/or participation restrictions) Clinical Presentation Clinical Decision Making (Complexity)   No documented Comorbidities or personal factors 1-2 Elements Stable and/or uncomplicated Low   1-2 documented comorbidities or personal factor 3  Elements Evolving clinical presentation with changing characteristics Moderate   3-4 documented comorbidities or personal factors 4 or more Unstable and unpredictable High              Deepti Kyle PT, [place]  10/17/2018  4:02 PM

## 2021-06-22 NOTE — PROGRESS NOTES
[facility] Daily Progress     Patient Name: Kenzie Peralta  Date: 2018  Visit #:   Referral Diagnosis: Chronic low back pain without sciatica  Referring provider: : Susan Be    Visit Diagnosis:     ICD-10-CM    1. Chronic bilateral low back pain without sciatica M54.5     G89.29          Assessment:     Patient is appropriate to continue with skilled physical therapy intervention, as indicated by initial plan of care.   Patient has had trouble arriving on time for her appointment in PT, and since the appointments are only 30 minutes we are getting only 15 to 20 minutes of treatment time. She does ask appropriate questions about her treatment, so this also does occupy some treatment time as well.    Goal Status:  Pt. will be independent with home exercise program in : 4 weeks  Patient will stand : 10 minutes;with less pain;in 12 weeks;Comment  Comment:: Current status is that patient never stands 30 minutes. She noted that after 10 minutes of talking to a group of people at [facility] standing she had to change her position. because pain increased to 4/10        Plan / Patient Education:     Continue with initial plan of care.   Check PYL and GES-V 91, and Kidney viscera 76    Subjective:     Pain Ratin  Central LBP- no radiation and no numbness  I am doing the trunk arch exercises and am on the elbows and also face up using to stretch out the middle back using a noodle    The worst the pain has been is a 3/10 since I was last in.      Objective:     Checked viscera - especially T12-L1 affecting extension of trunk (kidney and sphincter of ODDI)       Treatment Today     TREATMENT MINUTES COMMENTS   Evaluation     Self-care/ Home management     Manual therapy 12 Supine MT/SCS to abdomen including: JOSE-V, IVC-V   Neuromuscular Re-education     Therapeutic Activity     Therapeutic Exercises Nc-3 Did encourage patient to continue performing her thoracic and lumbar exercises to  self mobilize and improve pain and mobility   Gait training     Modality__________________             Pt 8 minutes late again for her appointment   Total 15    Blank areas are intentional and mean the treatment did not include these items.       Chacha Renee  12/5/2018

## 2021-06-22 NOTE — PROGRESS NOTES
[facility] Daily Progress     Patient Name: Kenzie Peralta  Date: 12/3/2018  Visit #: 3/12  Referral Diagnosis: Chronic low back pain without sciatica  Referring provider: : Susan Be  Visit Diagnosis:     ICD-10-CM    1. Chronic bilateral low back pain without sciatica M54.5     G89.29          Assessment:     Patient is in for her 3rd PT visit, and has very little change in her back pain when standing. In view of the difficulty she has with trunk extension, extension biased exercises were added today. We also did encourage her to increase her Kegel's to at least 30 reps per day.    Goal Status:  Pt. will be independent with home exercise program in : 4 weeks  Patient will stand : 10 minutes;with less pain;in 12 weeks;Comment  Comment:: Current status is that patient never stands 30 minutes. She noted that after 10 minutes of talking to a group of people at [facility] standing she had to change her position. because pain increased to 4/10        Plan / Patient Education:     Continue with initial plan of care.   Assess trunk extension biased exercises.  Check viscera - especially T12-L1 affecting extension of trunk (kidney and sphincter of ODDI)    Subjective:     Pain Ratin LB  Pain is centered over sacrum and no radiation, and increases when she is standing  Patient reports she is doing maybe 20 Kegel's per day.        Objective:     Modified Oswestry Low Back Pain Disablity Questionnaire  in %: 18 (12% months ago)  Trunk flexion is 6 1/2 inches and no increased back pain   Trunk extension is severely limited by thoracic kyphosis, and she has increased back pain with only 50% of normal motion. What is remarkable is that she has flexion of T12-L1 when at end range extension and it appears she is guarding the viscera.  PSIS level     Treatment Today     TREATMENT MINUTES COMMENTS   Evaluation     Self-care/ Home management     Manual therapy     Neuromuscular Re-education      Therapeutic Activity     Therapeutic Exercises 45 Discussed disc anatomy and anatomy of lumbar ligaments, as well as normal neutral posture. Added trunk extension in standing, and prone on elbows- with knee flexion or without, and pressups using pillows to decrease LBP. We also added thoracic extension while supine over 1 pillow and did try bolster as well but this was a little too aggressive and painful at several sites  Exercises:  Exercise #1: plie  Comment #1: supine: pelvic tilt, knee- chest, double knee-chest, with knees bend hip add/abd, provided written instructions  Exercise #2: Kegel's long contractions  Comment #2: 5 seconds, 6 reps, 2x/day and increase to 10 reps- 2x/day- patient isn't sure now many Kegel's contractions she is doing- but she thinks 5 second holds and 10 contractions per day.  Exercise #3: Kegel's short contractions  Comment #3: 1 second, 12 or more /day- patient think she is doing 10/day  Exercise #4: Standing trunk extension  Comment #4: 3x/hr, emphasized thoracic extension  Exercise #5: Prone on elbows  Comment #5: 1-5 minutes, 2x/day , may bend knees and use pillows  Exercise #6: Press ups  Comment #6: use 2 pillows under hips if needed, 1 minute - 2x/day      Gait training     Modality__________________       Patient arrived late and I worked past her treatment end time         Total 45    Blank areas are intentional and mean the treatment did not include these items.       Chacha Renee  12/3/2018

## 2021-06-22 NOTE — PROGRESS NOTES
[facility] Daily Progress /Monthly Summary    Patient Name: Kenzie Peralta  Date: 2019  Visit #   Referral Diagnosis:Chronic low back pain without sciatica  Referring provider: : Susan Be    Visit Diagnosis:     ICD-10-CM    1. Chronic bilateral low back pain without sciatica M54.5     G89.29          Assessment:   Patient has not been in for 3 weeks, due to a missed PT appointment on . She continues to work on pelvic floor strengthening with Kegel's but still has sacral pain when standing more than 10 minutes, and also has pelvic pain and the need to strain to defecate.  Patient is benefitting from skilled physical therapy and is making steady progress toward functional goals.    Goal Status:  Pt. will be independent with home exercise program in : 4 weeks;12 weeks  Patient will stand : 10 minutes;with less pain;in 12 weeks;Comment  Comment:: Current status is that patient never stands 30 minutes. She noted that after 10 minutes of talking to a group of people at [employer] while standing she had to change her position. because pain increased to 6-7/10      Plan / Patient Education:     Continue with initial plan of care.  Discuss alternatives to straining to defecate/affect on rectocele  Recheck PF strength with sEMG  Check BL-A 71, sacral nerve roots 79    Subjective:     Pain Ratin sacrum in sitting. But pain increases after standing even 5 minutes    Objective:     PSIS level  Lumbar AROM WFL    Treatment Today     TREATMENT MINUTES COMMENTS   Evaluation     Self-care/ Home management 15 Discussed options such as squatty potty to change the position during voiding, and also use of magnesium to help normalize soraida tone as patient states she must strain to defecate sometimes as long as 10 minutes.    Manual therapy 30 Supine and prone MT/SCS to abdomen and gluteals including: L EPF-V, SIGM-V, L URT-V, L SGLS-A, R SGL5-A, Bilat SGL4-A, L PUD-A   Neuromuscular Re-education      Therapeutic Activity     Therapeutic Exercises     Gait training     Modality__________________                Total 45    Blank areas are intentional and mean the treatment did not include these items.       Chacha Renee  1/4/2019

## 2021-06-23 NOTE — PROGRESS NOTES
[facility] Daily Progress     Patient Name: Kenzie Peralta  Date: 2019  Visit #:   Referral Diagnosis: Chronic LBP without sciatica, Discogenic LBP  Referring provider: Sarika Yoon MD  Visit Diagnosis:     ICD-10-CM    1. Chronic bilateral low back pain without sciatica M54.5     G89.29          Assessment:     Patient unable to tolerate pressups due to increased LBP  Patient is benefitting from skilled physical therapy and is making steady progress toward functional goals.    Goal Status:  Pt. 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    Pt will: be able to have bowel movment on a daily basis with 0-2/10 and in less than 5 minutes - complete evacuation in 12 weeks.: current status is she feels lightly improved by adding foot stool to position and magnesium to her diet.      Plan / Patient Education:   Recheck neural and arterial fascia restrictions affecting L5S1 extension  Continue with initial plan of care.    Subjective:     Pain Ratin  I am working on the exercises, and doing the back arch daily, and maybe doing 20 reps of Kegel's.  I think the magnesium is helping.   Prolonged standing is still an issue, and having a bowel movement has improved a little. It still depends on the size of the BM if I have to use my finger to evacuate.      Objective:     PSIS is level, and tender,  Very painful to move after laying prone 30 minutes for treatment in L5S1 area  Modified Oswestry Low Back Pain Disablity Questionnaire  in %: 26 (was 18% 2 months ago)       Treatment Today     TREATMENT MINUTES COMMENTS   Evaluation     Self-care/ Home management     Manual therapy     Neuromuscular Re-education 45 Prone MT/SCS to pelvis incxluding:  Bilat COCX-N, L RECT-N, L OI-N, L SGL-and IGL-N, R SGL-N, Stacked ICLUN and MCLUN-N,   Therapeutic Activity     Therapeutic Exercises     Gait training     Modality__________________             Patient arrived 6 minutes late for PT and then went to bathroom   Total 45    Blank areas are intentional and mean the treatment did not include these items.       Chacha Renee  2/8/2019

## 2021-06-23 NOTE — PROGRESS NOTES
[facility] Daily Progress/Monthly Summary     Patient Name: Kenzie Peralta  Date: 2019  Visit #:   Referral Diagnosis: Chronic low back pain without sciatica  Referring provider: : Susan Be       Visit Diagnosis:     ICD-10-CM    1. Chronic bilateral low back pain without sciatica M54.5     G89.29          Assessment:     Patient has not been in for PT since 19, and has not been doing either her pelvic floor exercises or her lumbar exercises. She has been on vacation for the last 3 weeks, and still has stated she does not have time to exercise. She has been working of improving her posture, and this is noted during her clinic exam today. She reports no change in her ability to defecate and still has thin ribbon of stool daily, and more than 50% of the time has to digitally assist with stool evacuation.  Patient states she is willing to be more diligent in her exercises, and I stated this is the only way we will know if the exercises have benefit for her.    Goal Status:  Pt. will be independent with home exercise program in : 4 weeks;12 weeks  Patient will stand : 10 minutes;with less pain;in 12 weeks;Comment  Comment:: Current status is that patient never stands 30 minutes. She noted that after 10 minutes of talking to a group of people at [employer] while standing she had to change her position. because pain increased to 6-7/10    Pt will: be able to have bowel movment on a daily basis with 0-2/10 and in less than 5 minutes - complete evacuation in 12 weeks.    Patient has no back pain when moving around and has been able to play pickle ball daily for 3 weeks without increased LBP.    Plan / Patient Education:     Continue with initial plan of care.   Recheck PF strength with sEMG, if she has been consistently doing her PF exercises. Perform all issued exercises   Check BL-A 71, sacral nerve roots 79, check Post ganglionic fascia 63       Subjective:     Pain Ratin LBP  now, right lateral waistline 2/10. I woke up today with sacral area pain and it was hard to straighten up - we were just on vacation and slept in a strange bed. I flew with my 1 year old and came back yesterday from [place].    Patient states she hasn't been doing her Kegel's exercises, but has been doing some of her back exercises and is focusing on her posture more.    It hurts a lot when I defecate, and it feels like I have to deliver an orange. Most of the time I have to strain to defecate, but sometimes I am loose and have to run to the bathroom. I haven't tried the foot stool to change the position for my BM. Most of the time my stool is like a thin ribbon, and I have to use a finger inserted into the rectum to widen  the space.      Objective:     Patient has been on vacation to [place], playing pickle ball, but has not done any of her PT exercises. We agreed that she was unlikely to have made any changes in her pelvic floor strength making the sEMG of the pelvic floor un needed , and clearly the rectocele symptoms have not improved.   I spent the entire hour refreshing the pelvic floor exercises with patient and the need to do them in order to improve her rectocele. I once again told her the minimum need to do per day of pelvic floor exercises was 30 per day, and that it would take 4-6 weeks to even see a change if the pelvic floor exercises would help her condition. I have advised her to do 10 second holds, 10 reps both am and pm and 15 reps of the 1 second Kegel's per day.    We also reviewed lumbar disc pressures in various positions, with the emphasis on frequency of bending and what this does to her disc and pelvic floor pressure as well. I did give her a handout on the Jackson study regarding disc pressures.    Treatment Today     TREATMENT MINUTES COMMENTS   Evaluation     Self-care/ Home management     Manual therapy 5- nc Prone MT/SCS to R PCUT-N, L COCX-N   Neuromuscular Re-education      Therapeutic Activity     Therapeutic Exercises 55 Reviewed pelvic floor exercises and the need to do them to attempt to resolve her rectocele, and to do her lumbar extension exercises to decrease her lumbar pain.   Gait training     Modality__________________                Total 60    Blank areas are intentional and mean the treatment did not include these items.       Chacha Renee  1/28/2019

## 2021-06-23 NOTE — PROGRESS NOTES
[facility] Daily Progress/Monthly Summary     Patient Name: Kenzie Peralta  Date: 2019  Visit #:   Referral Diagnosis: Chronic low back pain without sciatica  Referring provider: Sarika Yoon MD  Visit Diagnosis:     ICD-10-CM    1. Chronic bilateral low back pain without sciatica M54.5     G89.29          Assessment:   Patient is still having trouble with prolonged standing causing severe back pain and inability to walk. She is working on pelvic floor and back exercises, but needs to increase repetitions.  Patient is benefitting from skilled physical therapy and is making steady progress toward functional goals.    Goal Status:  Pt. 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    Pt will: be able to have bowel movment on a daily basis with 0-2/10 and in less than 5 minutes - complete evacuation in 12 weeks.: current status is she feels lightly improved by adding foot stool to position and magnesium to her diet.      Plan / Patient Education:     Continue with initial plan of care.   Do Oswestry next visit, and recheck neural and arterial fascia restrictions    Subjective:     Pain Ratin  I had the worst LBP this morning ever! I was standing and visiting with people for 30 min while inside a school building with thin carpeting- it was hard to walk to the car after. No numbness in into the legs, and pain was central LB.  I also woke up with a HA and it is still a 3/10 on the right side of the head.    I did try hoisting one leg up and it seemed to help. I am taking magnesium at night and that seems to be helping.    I am doing my exercises: both Kegel's (10 to 20 reps /er day) and prone on my elbows.      Objective:     Patient was  encouraged to increase to 30 reps of Kegel's per day.  No obvious lumbar or sacral facet dysfunction noted.  Arterial fascia dysfunction noted in abdomen may actually be a limiting factor in trunk extension leading to difficulty with prolonged standing      Treatment Today     TREATMENT MINUTES COMMENTS   Evaluation     Self-care/ Home management     Manual therapy 53 Supine MT/SCS to pelvis , abdomen, and head including: R SHAHLA and PAR-LV, ABDA-A, Left JEJ-V, Bilat EIL-A, R BL-A,    Neuromuscular Re-education     Therapeutic Activity     Therapeutic Exercises     Gait training     Modality__________________                Total 53    Blank areas are intentional and mean the treatment did not include these items.       Chacha Renee  2/4/2019

## 2021-06-24 NOTE — PROGRESS NOTES
[facility] Daily Progress     Patient Name: Kenzie Peralta  Date: 2019  Visit #:   Referral Diagnosis: Chronic LBP without sciatica, Discogenic LBP  Referring provider: Sarika Yoon MD  Visit Diagnosis:     ICD-10-CM    1. Chronic bilateral low back pain without sciatica M54.5     G89.29          Assessment:     No changes in standing LBP - will continue 1-2 sessions and then release patient to HEP.    Goal Status:  Pt. 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    Pt will: be able to have bowel movment on a daily basis with 0-2/10 and in less than 5 minutes - complete evacuation in 12 weeks.: current status is she feels lightly improved by adding foot stool to position and magnesium to her diet.      Plan / Patient Education:     Continue with initial plan of care.  Recheck Kegel's. Focus on L5 S1 release. Check Thoracic periosteal and lumbar MSaffecting L5S1 extension- LSAC-A 69    Subjective:     Pain Ratin LBP and no radiation       Objective:     Cranial scan + in multiple systems including MS. Patient reports she is able to be active and play pickleball, but still having a hard time standing up straight.    Treatment Today     TREATMENT MINUTES COMMENTS   Evaluation     Self-care/ Home management     Manual therapy 55 Supine MT/SCS to neck and abdomen including: L SHAHLA and IJ-LV, Bilt IEPI-A, R BRD-V, R EIL-LV, L LUM-LV, ABDA-A, R URTY-V, R KS and LI-V, L KI-V,    Neuromuscular Re-education     Therapeutic Activity     Therapeutic Exercises     Gait training     Modality__________________                Total 55    Blank areas are intentional and mean the treatment did not include these items.       Chacha Renee  2019

## 2021-06-25 NOTE — PROGRESS NOTES
[facility] Daily Progress /Monthly Summary    Patient Name: Kenzie Peralta  Date: 3/18/2019  Visit #: 10/12  Referral Diagnosis: Chronic LBP without sciatica, Discogenic LBP  Referring provider: Sarika Yoon MD  Visit Diagnosis:     ICD-10-CM    1. Chronic bilateral low back pain without sciatica M54.5     G89.29          Assessment:     No changes in standing LBP - will continue 1-2 sessions and then release patient to HEP.  Patient has been advised to recheck with Dr. Granados regarding her symptoms and little change in LBP with standing, as I am concerned with severe difficulties when MRI is very limited in findings.    Goal Status:  Pt. will be independent with home exercise program in : 4 weeks;12 weeks  Patient will stand : 10 minutes;with less pain;in 12 weeks;Comment  Comment:: Current status is that patient stands 1-2 minutes to cook and must walk or sit to relieve pain    Pt will: be able to have bowel movment on a daily basis with 0-2/10 and in less than 5 minutes - complete evacuation in 12 weeks.: current status is she feels lightly improved by adding foot stool to position and magnesium to her diet.      Plan / Patient Education:     Continue with initial plan of care.  Recheck Kegel's. Focus on L5 S1 release. Check Thoracic periosteal and lumbar MSaffecting L5S1 extension- LSAC-A 69    Subjective:     Pain Ratin LBP and no radiation   Patient still has difficulty standing in one position for more than 1 minute, which makes meal prep, cleaning such as dishes, etc.       Objective:     Cranial scan + in multiple systems including MS. Patient reports she is able to be active and play pickleball, but still having a hard time standing up straight.    Treatment Today     TREATMENT MINUTES COMMENTS   Evaluation     Self-care/ Home management     Manual therapy 30 Supine MT/SCS to abdomen and spine including: stacked SMEDT8-L5-A, ABDA-A, L EIL-A,    Neuromuscular Re-education      Therapeutic Activity     Therapeutic Exercises 25 Reviewed all exercises and reinforced continuing: Exercises:  Exercise #1: Supine with back pillow to arch before getting up  Comment #1: daily 1 minut  Exercise #2: Kegel's long contractions  Comment #2: 10 seconds, 10 reps, 2x/day and increase to 10 reps- 2x/day- patient is only doing 5-10 reps of the long contractions- 1x/day, 10 seconds  Exercise #3: Kegel's short contractions  Comment #3: 1 second, 12 or more /day- patient thinks she is not regular with her exercises- 5 reps/day  Exercise #4: Standing trunk extension  Comment #4: 3x/hr, emphasized thoracic extension  Exercise #5: Prone on elbows  Comment #5: 1-5 minutes, 2x/day , may bend knees and use pillows  Exercise #6: Press ups  Comment #6: use 2 pillows under hips if needed, 1 minute - 2x/day      Gait training     Modality__________________                Total 55    Blank areas are intentional and mean the treatment did not include these items.       Chacha Renee  3/18/2019

## 2021-06-27 ENCOUNTER — HEALTH MAINTENANCE LETTER (OUTPATIENT)
Age: 46
End: 2021-06-27

## 2021-07-03 NOTE — ADDENDUM NOTE
Addendum Note by Sarika Yoon MD at 7/11/2018  6:44 PM     Author: Sarika Yoon MD Service: -- Author Type: Physician    Filed: 7/11/2018  6:44 PM Encounter Date: 7/10/2018 Status: Signed    : Sarika Yoon MD (Physician)    Addended by: SARIKA YOON on: 7/11/2018 06:44 PM        Modules accepted: Orders

## 2021-07-03 NOTE — ADDENDUM NOTE
Addendum Note by Sarika Yoon MD at 2/12/2019  4:03 PM     Author: Sarika Yoon MD Service: -- Author Type: Physician    Filed: 2/12/2019  4:03 PM Encounter Date: 2/12/2019 Status: Signed    : Sarika Yoon MD (Physician)    Addended by: SARIKA YOON on: 2/12/2019 04:03 PM        Modules accepted: Orders

## 2021-07-14 PROBLEM — Z34.90 PREGNANT: Status: RESOLVED | Noted: 2017-09-24 | Resolved: 2017-09-24

## 2021-07-14 PROBLEM — O47.03 THREATENED PRETERM LABOR, THIRD TRIMESTER: Status: RESOLVED | Noted: 2017-09-12 | Resolved: 2017-09-18

## 2021-07-14 PROBLEM — O09.523 ELDERLY MULTIGRAVIDA IN THIRD TRIMESTER: Status: RESOLVED | Noted: 2017-07-09 | Resolved: 2017-11-08

## 2021-07-14 PROBLEM — Z37.9 NORMAL LABOR: Status: RESOLVED | Noted: 2017-09-24 | Resolved: 2017-09-24

## 2021-07-14 PROBLEM — O09.529 AMA (ADVANCED MATERNAL AGE) MULTIGRAVIDA 35+: Status: RESOLVED | Noted: 2017-08-09 | Resolved: 2017-11-08

## 2021-07-14 PROBLEM — Z34.91 PREGNANT AND NOT YET DELIVERED IN FIRST TRIMESTER: Status: RESOLVED | Noted: 2017-02-20 | Resolved: 2017-09-18

## 2021-07-14 PROBLEM — O99.013 ANTEPARTUM ANEMIA IN THIRD TRIMESTER: Status: RESOLVED | Noted: 2017-09-18 | Resolved: 2017-11-08

## 2021-07-14 PROBLEM — O09.293: Status: RESOLVED | Noted: 2017-09-24 | Resolved: 2017-09-24

## 2021-07-14 PROBLEM — O99.013 ANEMIA IN PREGNANCY, THIRD TRIMESTER: Status: RESOLVED | Noted: 2017-07-05 | Resolved: 2017-09-19

## 2021-07-14 PROBLEM — O09.529 SUPERVISION OF HIGH-RISK PREGNANCY OF ELDERLY MULTIGRAVIDA: Status: RESOLVED | Noted: 2017-03-08 | Resolved: 2017-11-08

## 2021-10-17 ENCOUNTER — HEALTH MAINTENANCE LETTER (OUTPATIENT)
Age: 46
End: 2021-10-17

## 2021-11-23 ENCOUNTER — TRANSFERRED RECORDS (OUTPATIENT)
Dept: HEALTH INFORMATION MANAGEMENT | Facility: CLINIC | Age: 46
End: 2021-11-23

## 2022-07-24 ENCOUNTER — HEALTH MAINTENANCE LETTER (OUTPATIENT)
Age: 47
End: 2022-07-24

## 2022-10-02 ENCOUNTER — HEALTH MAINTENANCE LETTER (OUTPATIENT)
Age: 47
End: 2022-10-02

## 2023-08-12 ENCOUNTER — HEALTH MAINTENANCE LETTER (OUTPATIENT)
Age: 48
End: 2023-08-12

## 2025-01-01 ENCOUNTER — HOSPITAL ENCOUNTER (EMERGENCY)
Facility: HOSPITAL | Age: 50
Discharge: LEFT BEFORE DISCHARGE | End: 2025-01-01

## 2025-01-01 VITALS
BODY MASS INDEX: 18.81 KG/M2 | WEIGHT: 120.1 LBS | SYSTOLIC BLOOD PRESSURE: 114 MMHG | TEMPERATURE: 97.6 F | OXYGEN SATURATION: 95 % | DIASTOLIC BLOOD PRESSURE: 71 MMHG | HEART RATE: 84 BPM | RESPIRATION RATE: 18 BRPM

## 2025-01-01 PROCEDURE — 99281 EMR DPT VST MAYX REQ PHY/QHP: CPT

## 2025-01-01 ASSESSMENT — COLUMBIA-SUICIDE SEVERITY RATING SCALE - C-SSRS
2. HAVE YOU ACTUALLY HAD ANY THOUGHTS OF KILLING YOURSELF IN THE PAST MONTH?: NO
6. HAVE YOU EVER DONE ANYTHING, STARTED TO DO ANYTHING, OR PREPARED TO DO ANYTHING TO END YOUR LIFE?: NO
1. IN THE PAST MONTH, HAVE YOU WISHED YOU WERE DEAD OR WISHED YOU COULD GO TO SLEEP AND NOT WAKE UP?: NO

## 2025-01-01 NOTE — ED TRIAGE NOTES
Pt reports she tore her quad on Sunday, was icing it today, and left the icepack on for an unknown amount of time.  Pt now has an orange sized area of reddened skin with blisters.  Concern for frost bite.       Triage Assessment (Adult)       Row Name 01/01/25 1544          Triage Assessment    Airway WDL WDL        Respiratory WDL    Respiratory WDL WDL        Skin Circulation/Temperature WDL    Skin Circulation/Temperature WDL WDL        Cardiac WDL    Cardiac WDL WDL        Peripheral/Neurovascular WDL    Peripheral Neurovascular WDL WDL        Cognitive/Neuro/Behavioral WDL    Cognitive/Neuro/Behavioral WDL WDL

## 2025-01-19 ENCOUNTER — HEALTH MAINTENANCE LETTER (OUTPATIENT)
Age: 50
End: 2025-01-19

## 2025-08-31 ENCOUNTER — HEALTH MAINTENANCE LETTER (OUTPATIENT)
Age: 50
End: 2025-08-31